# Patient Record
Sex: FEMALE | Race: WHITE | NOT HISPANIC OR LATINO
[De-identification: names, ages, dates, MRNs, and addresses within clinical notes are randomized per-mention and may not be internally consistent; named-entity substitution may affect disease eponyms.]

---

## 2022-10-30 ENCOUNTER — APPOINTMENT (OUTPATIENT)
Dept: ORTHOPEDIC SURGERY | Facility: CLINIC | Age: 47
End: 2022-10-30

## 2022-10-30 VITALS — BODY MASS INDEX: 24.44 KG/M2 | WEIGHT: 165 LBS | HEIGHT: 69 IN

## 2022-10-30 PROBLEM — Z00.00 ENCOUNTER FOR PREVENTIVE HEALTH EXAMINATION: Status: ACTIVE | Noted: 2022-10-30

## 2022-10-30 PROCEDURE — 20552 NJX 1/MLT TRIGGER POINT 1/2: CPT

## 2022-10-30 PROCEDURE — 99204 OFFICE O/P NEW MOD 45 MIN: CPT | Mod: 25

## 2022-10-30 PROCEDURE — J3490M: CUSTOM

## 2022-10-30 PROCEDURE — 99214 OFFICE O/P EST MOD 30 MIN: CPT | Mod: 25

## 2022-10-30 PROCEDURE — 72100 X-RAY EXAM L-S SPINE 2/3 VWS: CPT

## 2022-10-30 PROCEDURE — 72170 X-RAY EXAM OF PELVIS: CPT

## 2022-10-30 RX ORDER — CYCLOBENZAPRINE HYDROCHLORIDE 10 MG/1
10 TABLET, FILM COATED ORAL 3 TIMES DAILY
Qty: 30 | Refills: 1 | Status: ACTIVE | COMMUNITY
Start: 2022-10-30 | End: 1900-01-01

## 2022-10-30 NOTE — PROCEDURE
[Trigger point 1-2 muscle groups] : trigger point 1-2 muscle groups [Left] : of the left [Lumbar paraspinal muscle] : lumbar paraspinal muscle [Pain] : pain [Inflammation] : inflammation [Alcohol] : alcohol [Betadine] : betadine [Ethyl Chloride sprayed topically] : ethyl chloride sprayed topically [Sterile technique used] : sterile technique used [___ cc    1%] : Lidocaine ~Vcc of 1%  [___ cc    0.25%] : Bupivacaine (Marcaine) ~Vcc of 0.25%  [___ cc    40mg] : Methylprednisolone (Depomedrol) ~Vcc of 40 mg  [] : Patient tolerated procedure well [Call if redness, pain or fever occur] : call if redness, pain or fever occur [Apply ice for 15min out of every hour for the next 12-24 hours as tolerated] : apply ice for 15 minutes out of every hour for the next 12-24 hours as tolerated [Patient was advised to rest the joint(s) for ____ days] : patient was advised to rest the joint(s) for [unfilled] days [Risks, benefits, alternatives discussed / Verbal consent obtained] : the risks benefits, and alternatives have been discussed, and verbal consent was obtained

## 2022-10-30 NOTE — ASSESSMENT
[FreeTextEntry1] : A/P LBP without radiculopathy\par - trigger point injection\par - muscle relaxant\par - f/u spine 1-2 weeks\par

## 2022-10-30 NOTE — HISTORY OF PRESENT ILLNESS
[8] : 8 [de-identified] : 45 y/o M with L low back pain x 4 days after lifting a box. Denies numbness/tingling. denies bladder or bowel issues. Has tried tylenol and heat without relief.\par denies DM.\par \par PMHX: UC\par  [FreeTextEntry5] : pt states past wednesday lifted a heavy window and felt pain in her low back

## 2022-10-30 NOTE — IMAGING
[de-identified] : PE lumbar spine: +tenderness to L paraspinals, no midline tenderness, limited motion due to pain, able to SLR with pain, motor and sensory intact, NVI\par  [No bony abnormalities] : No bony abnormalities

## 2022-11-08 ENCOUNTER — APPOINTMENT (OUTPATIENT)
Dept: ORTHOPEDIC SURGERY | Facility: CLINIC | Age: 47
End: 2022-11-08

## 2022-11-08 DIAGNOSIS — M62.830 MUSCLE SPASM OF BACK: ICD-10-CM

## 2022-11-08 PROCEDURE — 99214 OFFICE O/P EST MOD 30 MIN: CPT

## 2022-11-08 PROCEDURE — 99204 OFFICE O/P NEW MOD 45 MIN: CPT

## 2022-11-08 RX ORDER — DICLOFENAC SODIUM 1% 10 MG/G
1 GEL TOPICAL DAILY
Qty: 1 | Refills: 3 | Status: ACTIVE | COMMUNITY
Start: 2022-11-08 | End: 1900-01-01

## 2022-11-08 RX ORDER — LIDOCAINE 40 MG/G
4 PATCH TOPICAL
Qty: 30 | Refills: 2 | Status: ACTIVE | COMMUNITY
Start: 2022-11-08 | End: 1900-01-01

## 2022-11-08 NOTE — HISTORY OF PRESENT ILLNESS
[6] : 6 [3] : 3 [Localized] : localized [Tightness] : tightness [de-identified] : Pt seen by non-spine partners in the past\par \par BERNARDO Berman note 10/30/22- "45 y/o M with L low back pain x 4 days after lifting a box. Denies numbness/tingling. denies bladder or bowel issues. Has tried tylenol and heat without relief."\par denies DM.\par \par PMHX: UC\par \par 11/8/22- Was given TPI in office and RXed cyclobenzaprine at [facility], with partial relief. Still no pain/N/T in BLEs.  [] : no [FreeTextEntry5] : pt states 10/26/2022 lifted a heavy window and felt pain in her low back  [de-identified] : BERNARDO Vaughn  [de-identified] : x-ray

## 2022-11-08 NOTE — ASSESSMENT
[FreeTextEntry1] : PT, c/w meds/topicals for lumbar spasm/ L SIJ inflammation\par cannot take NSAIDS due to UC\par follow up 6 weeks\par will consider imaging if no improvement\par \par Muscle Relaxants- To help decrease muscle spasm and assist with pain relief. Advised of sedating effects and instructed not to drive, operate heavy machinery, or take with other sedating medications.\par

## 2022-11-08 NOTE — IMAGING
[Straightening consistent with spasm] : Straightening consistent with spasm [AP] : anteroposterior [There are no fractures, subluxations or dislocations. No significant abnormalities are seen] : There are no fractures, subluxations or dislocations. No significant abnormalities are seen [de-identified] : LSPINE\par \par Palpation: No tenderness to palpation or spasm in bilateral thoracic and lumbar paraspinal musculature, L SI joint tenderness to palpation\par ROM: Full with stiffness\par Strength: 5/5 bilateral hip flexors, knee extensors, ankle dorsiflexors, EHL, ankle plantarflexors\par Sensation: Sensation present to light touch bilateral L2-S1 distributions\par Provocative maneuvers: Negative bilateral straight leg raise\par \par Bilateral hips-\par Palpation: No tenderness to palpation over greater trochanter or IT band\par \par \par

## 2022-11-10 ENCOUNTER — FORM ENCOUNTER (OUTPATIENT)
Age: 47
End: 2022-11-10

## 2022-12-03 ENCOUNTER — APPOINTMENT (OUTPATIENT)
Dept: ORTHOPEDIC SURGERY | Facility: CLINIC | Age: 47
End: 2022-12-03

## 2022-12-03 VITALS — WEIGHT: 165 LBS | HEIGHT: 69 IN | BODY MASS INDEX: 24.44 KG/M2

## 2022-12-03 DIAGNOSIS — Z78.9 OTHER SPECIFIED HEALTH STATUS: ICD-10-CM

## 2022-12-03 PROCEDURE — 99213 OFFICE O/P EST LOW 20 MIN: CPT

## 2022-12-03 RX ORDER — METHOCARBAMOL 750 MG/1
750 TABLET, FILM COATED ORAL TWICE DAILY
Qty: 60 | Refills: 0 | Status: ACTIVE | COMMUNITY
Start: 2022-12-03 | End: 1900-01-01

## 2022-12-03 NOTE — HISTORY OF PRESENT ILLNESS
[Lower back] : lower back [Dull/Aching] : dull/aching [Radiating] : radiating [Sharp] : sharp [Shooting] : shooting [Constant] : constant [Not working due to injury] : Work status: not working due to injury [6] : 6 [3] : 3 [Localized] : localized [Tightness] : tightness [de-identified] : 12-3-22- she has 6 week history of left sided lower back pain. Initially made some improvement with tpi from our urgent care. Then followed up with Dr Herrera and was sent for therapy with some help. She is now noting radicular complaints left leg to the foot, and ambulating with a limp \par \par \par Pt seen by non-spine partners in the past\par \par BERNARDO Berman note 10/30/22- "47 y/o M with L low back pain x 4 days after lifting a box. Denies numbness/tingling. denies bladder or bowel issues. Has tried tylenol and heat without relief."\par denies DM.\par \par PMHX: UC\par \par 11/8/22- Was given TPI in office and RXed cyclobenzaprine at [facility], with partial relief. Still no pain/N/T in BLEs.  [] : Post Surgical Visit: no [FreeTextEntry3] : 12/2/22 [FreeTextEntry5] : pt states 10/26/2022 lifted a heavy window and felt pain in her low back  [de-identified] : BERNARDO Vaughn  [de-identified] : x-ray

## 2022-12-03 NOTE — IMAGING
[Straightening consistent with spasm] : Straightening consistent with spasm [AP] : anteroposterior [There are no fractures, subluxations or dislocations. No significant abnormalities are seen] : There are no fractures, subluxations or dislocations. No significant abnormalities are seen [de-identified] : \par \par \par

## 2022-12-03 NOTE — PHYSICAL EXAM
[Flexion] : flexion [Extension] : extension [Bending to left] : bending to left [Bending to right] : bending to right [] : patient ambulates without assistive device [de-identified] : radicular complaints anterior left leg to the knee

## 2022-12-03 NOTE — ASSESSMENT
[FreeTextEntry1] : 12-3-22- she has had 6 weeks of conservative measures including therapy tpi and meds. she has now left leg radicular complaints. will add medrol and change muscle relaxer to robaxin. continue with therapy get mri f/u with DR Herrera likely need for lesi \par \par PT, c/w meds/topicals for lumbar spasm/ L SIJ inflammation\par cannot take NSAIDS due to UC\par follow up 6 weeks\par will consider imaging if no improvement\par \par Muscle Relaxants- To help decrease muscle spasm and assist with pain relief. Advised of sedating effects and instructed not to drive, operate heavy machinery, or take with other sedating medications.\par

## 2022-12-06 ENCOUNTER — FORM ENCOUNTER (OUTPATIENT)
Age: 47
End: 2022-12-06

## 2022-12-07 ENCOUNTER — APPOINTMENT (OUTPATIENT)
Dept: MRI IMAGING | Facility: CLINIC | Age: 47
End: 2022-12-07

## 2022-12-07 ENCOUNTER — TRANSCRIPTION ENCOUNTER (OUTPATIENT)
Age: 47
End: 2022-12-07

## 2022-12-07 PROCEDURE — 72148 MRI LUMBAR SPINE W/O DYE: CPT

## 2022-12-08 ENCOUNTER — NON-APPOINTMENT (OUTPATIENT)
Age: 47
End: 2022-12-08

## 2022-12-08 ENCOUNTER — APPOINTMENT (OUTPATIENT)
Dept: GASTROENTEROLOGY | Facility: CLINIC | Age: 47
End: 2022-12-08

## 2022-12-08 VITALS
OXYGEN SATURATION: 98 % | WEIGHT: 165 LBS | TEMPERATURE: 97.3 F | DIASTOLIC BLOOD PRESSURE: 90 MMHG | HEIGHT: 69 IN | HEART RATE: 70 BPM | BODY MASS INDEX: 24.44 KG/M2 | SYSTOLIC BLOOD PRESSURE: 140 MMHG

## 2022-12-08 PROCEDURE — 99203 OFFICE O/P NEW LOW 30 MIN: CPT

## 2022-12-08 NOTE — HISTORY OF PRESENT ILLNESS
[FreeTextEntry1] : 46F with history of ulcerative colitis, referred by Dr Pavon for second opinion. \par \par The patient initially presented to [facility] in August 2021 w/ bloody diarrhea. \par Colonoscopy 9/2021  showed normal TI, diffuse discontinuous abnormal vascularity, scarring, pseudopolyps. Minimal friability. Several shallow ulcers in right colon, rectum normal. \par Biopsies showed TI w lymphoid aggregates, colon biopsies showed mild chronic active to quiescent colitis in all biopsies\par \par She was started on po prednisone and oral mesalamine but continued to have loose stools (2-3x per day). Started on Zeposia in September 2022 via patient assistance program (not covered by her insurance). \par \par She has not noticed a change to her symptoms since starting zeposia. \par Had tapered off steroids a few weeks ago but then restarted by ortho team for back pain. Last dose of medrol is tomorrow. \par \par Currently has ~2 bowel movements per day, usually loose. \par No blood in stool. \par Denies abd pain, n/v, hematochezia, melena, rashes, eye pain, joint pain. \par \par No fhx of IBD or malignancy. \par \par \par \par

## 2022-12-08 NOTE — ASSESSMENT
[FreeTextEntry1] : 46F with ulcerative colitis, patient of Dr Pavon, referred for second opinion. Diagnosed in 2021, treated w prolonged prednisone taper, oral mesalamine, and now zeposia (since 9/2022). Reporting loose nonbloody bowel movements 2-3 x per day. No other complaints. No fhx of IBD or GI malignancy. \par \par - check inflammatory markers - crp, fecal debbie today \par - repeat labs and pre-infusion w/u \par - depending on above will consider repeat colonoscopy\par - if evidence of active and ongoing inflammation, will consider switching from zeposia to another agent - -perhaps entyvio vs stelara \par - continue mesalamine \par - continue zeposia \par - RTC after labs, colonoscopy \par \par

## 2022-12-12 ENCOUNTER — APPOINTMENT (OUTPATIENT)
Dept: ORTHOPEDIC SURGERY | Facility: CLINIC | Age: 47
End: 2022-12-12

## 2022-12-12 VITALS — WEIGHT: 165 LBS | HEIGHT: 69 IN | BODY MASS INDEX: 24.44 KG/M2

## 2022-12-12 DIAGNOSIS — M51.36 OTHER INTERVERTEBRAL DISC DEGENERATION, LUMBAR REGION: ICD-10-CM

## 2022-12-12 PROCEDURE — 99215 OFFICE O/P EST HI 40 MIN: CPT

## 2022-12-12 NOTE — HISTORY OF PRESENT ILLNESS
[Lower back] : lower back [Gradual] : gradual [de-identified] : 12/7/22 Lumbar MRI  - report noted in chart. \par Findings: Straightening of upper lordosis with maintenance of lower lordosis. No compression fracture. No\par spondylolysis. Hemangioma at the posterior T12.\par T12-L1: No herniation, foraminal stenosis, or central stenosis.\par L1-L2: No herniation, foraminal stenosis, or central stenosis. Facet arthrosis and ligamentum flavum \par hypertrophy.\par L2-L3: No herniation, foraminal stenosis, or central stenosis. Facet arthrosis, ligamentum flavum hypertrophy,\par and facet effusion.\par L3-L4: Broad bulge, facet arthrosis, ligamentum flavum hypertrophy with facet effusion. Left foraminal \par herniation and annular fissure with left foraminal stenosis. No central herniation or central stenosis.\par L4-L5: Minor retrolisthesis. Broad bulge, facet arthrosis, ligamentum flavum hypertrophy, and facet effusion. \par Left foraminal herniation with proximal left foraminal stenosis. No central herniation or central stenosis.\par L5-S1: No herniation, foraminal stenosis, or central stenosis. Facet arthrosis, ligamentum flavum hypertrophy,\par and left facet effusion.\par Conus terminates normally at T12-L1. No aneurysm of the aorta. Soft tissues are intact. No muscle tear. No \par ligament tear. No fluid collection.\par Dependent edema in the posterior subcutaneous fat.\par Incompletely evaluated greater than 12 mm Tarlov cyst posterior to the third sacral segment.\par Ind. review- \par Annular tear L L3/4 foramen\par NF HNP L L4/5\par ------------------------\par \par BERNARDO Berman note 10/30/22- "47 y/o F with L low back pain x 4 days after lifting a box. Denies numbness/tingling. denies bladder or bowel issues. Has tried tylenol and heat without relief."\par denies DM.\par \par PMHX: UC\par \par 11/8/22- Was given TPI in office and RXed cyclobenzaprine at [facility], with partial relief. Still no pain/N/T in BLEs. \par 12/12/22- MRI f/u. Has burning pain in to the LLE anterior thigh to knee [6] : 6 [3] : 3 [Localized] : localized [Tightness] : tightness [] : yes [FreeTextEntry5] : pt states 10/26/2022 lifted a heavy window and felt pain in her low back  [de-identified] : BERNARDO Vaughn  [de-identified] : x-ray

## 2022-12-12 NOTE — IMAGING
[de-identified] : LSPINE\par \par Palpation: No tenderness to palpation or spasm in bilateral thoracic and lumbar paraspinal musculature, L SI joint tenderness to palpation\par ROM: Full with stiffness\par Strength: 5/5 bilateral hip flexors, knee extensors, ankle dorsiflexors, EHL, ankle plantarflexors\par Sensation: Sensation present to light touch bilateral L2-S1 distributions\par Provocative maneuvers: Negative bilateral straight leg raise\par \par Bilateral hips-\par Palpation: No tenderness to palpation over greater trochanter or IT band\par \par \par  [Straightening consistent with spasm] : Straightening consistent with spasm [AP] : anteroposterior [There are no fractures, subluxations or dislocations. No significant abnormalities are seen] : There are no fractures, subluxations or dislocations. No significant abnormalities are seen

## 2022-12-12 NOTE — ASSESSMENT
[FreeTextEntry1] : Annular tear L L3/4 foramen\par NF HNP L L4/5\par Pain c/s\par \par Gabapentin- Patient advised of sedating effects, instructed not to drive, operate machinery, or take with other sedating medications. Advised of need to taper on/off medication and risk of abruptly stopping gabapentin. \par

## 2022-12-19 ENCOUNTER — NON-APPOINTMENT (OUTPATIENT)
Age: 47
End: 2022-12-19

## 2022-12-19 LAB
24R-OH-CALCIDIOL SERPL-MCNC: 42.7 PG/ML
ALBUMIN SERPL ELPH-MCNC: 5.1 G/DL
ALP BLD-CCNC: 117 U/L
ALT SERPL-CCNC: 18 U/L
ANION GAP SERPL CALC-SCNC: 13 MMOL/L
AST SERPL-CCNC: 18 U/L
BASOPHILS # BLD AUTO: 0.07 K/UL
BASOPHILS NFR BLD AUTO: 0.7 %
BILIRUB SERPL-MCNC: 0.3 MG/DL
BUN SERPL-MCNC: 14 MG/DL
C DIFF TOXIN B QL PCR REFLEX: NORMAL
CALCIUM SERPL-MCNC: 10.2 MG/DL
CHLORIDE SERPL-SCNC: 104 MMOL/L
CO2 SERPL-SCNC: 24 MMOL/L
CREAT SERPL-MCNC: 0.6 MG/DL
CRP SERPL-MCNC: <3 MG/L
EGFR: 111 ML/MIN/1.73M2
EOSINOPHIL # BLD AUTO: 0.03 K/UL
EOSINOPHIL NFR BLD AUTO: 0.3 %
GDH ANTIGEN: NOT DETECTED
GLUCOSE SERPL-MCNC: 104 MG/DL
HBV CORE IGG+IGM SER QL: NONREACTIVE
HBV SURFACE AB SER QL: NONREACTIVE
HBV SURFACE AG SER QL: NONREACTIVE
HCT VFR BLD CALC: 48.6 %
HCV AB SER QL: NONREACTIVE
HCV S/CO RATIO: 0.2 S/CO
HEPATITIS A IGG ANTIBODY: NONREACTIVE
HGB BLD-MCNC: 15.8 G/DL
IMM GRANULOCYTES NFR BLD AUTO: 0.7 %
LYMPHOCYTES # BLD AUTO: 0.52 K/UL
LYMPHOCYTES NFR BLD AUTO: 5.4 %
M TB IFN-G BLD-IMP: NEGATIVE
MAN DIFF?: NORMAL
MCHC RBC-ENTMCNC: 32.5 GM/DL
MCHC RBC-ENTMCNC: 32.5 PG
MCV RBC AUTO: 100 FL
MONOCYTES # BLD AUTO: 0.68 K/UL
MONOCYTES NFR BLD AUTO: 7 %
NEUTROPHILS # BLD AUTO: 8.3 K/UL
NEUTROPHILS NFR BLD AUTO: 85.9 %
PLATELET # BLD AUTO: 266 K/UL
POTASSIUM SERPL-SCNC: 4.6 MMOL/L
PROT SERPL-MCNC: 7.8 G/DL
QUANTIFERON TB PLUS MITOGEN MINUS NIL: 4.82 IU/ML
QUANTIFERON TB PLUS NIL: 0.02 IU/ML
QUANTIFERON TB PLUS TB1 MINUS NIL: 0 IU/ML
QUANTIFERON TB PLUS TB2 MINUS NIL: 0 IU/ML
RBC # BLD: 4.86 M/UL
RBC # FLD: 11.9 %
SODIUM SERPL-SCNC: 141 MMOL/L
TOXIN A AND B: NOT DETECTED
WBC # FLD AUTO: 9.67 K/UL

## 2022-12-20 LAB — CALPROTECTIN FECAL: 41 UG/G

## 2022-12-30 ENCOUNTER — APPOINTMENT (OUTPATIENT)
Dept: PAIN MANAGEMENT | Facility: CLINIC | Age: 47
End: 2022-12-30
Payer: COMMERCIAL

## 2022-12-30 VITALS — WEIGHT: 171 LBS | BODY MASS INDEX: 25.33 KG/M2 | HEIGHT: 69 IN

## 2022-12-30 DIAGNOSIS — M79.10 MYALGIA, UNSPECIFIED SITE: ICD-10-CM

## 2022-12-30 DIAGNOSIS — M53.3 SACROCOCCYGEAL DISORDERS, NOT ELSEWHERE CLASSIFIED: ICD-10-CM

## 2022-12-30 PROCEDURE — 99204 OFFICE O/P NEW MOD 45 MIN: CPT

## 2022-12-30 NOTE — HISTORY OF PRESENT ILLNESS
[Lower back] : lower back [Left Leg] : left leg [Burning] : burning [Sharp] : sharp [Constant] : constant [Household chores] : household chores [Leisure] : leisure [Sleep] : sleep [Social interactions] : social interactions [Meds] : meds [Heat] : heat [Sitting] : sitting [Standing] : standing [Walking] : walking [Bending forward] : bending forward [Exercising] : exercising [Stairs] : stairs [Lying in bed] : lying in bed [FreeTextEntry1] : Initial HPI 12/30/2022:\par Pain started Oct 2022 and is on the LEFT side of the lower back and radiates down the left medial thigh to he knee described as a sharp burning pain with associated heaviness in the leg. No numbness/tingling. Pain is worse with sitting.  \par \par MRI Lumbar Spine 12/7/22 independently reviewed: L3-4, L4-5 Left HNP \par Conservative Care: Has tried PT \par Pain Medications: gabapentin 300mg BID \par Past Injections: none\par Spine surgery: none \par Blood thinners: none\par  [] : no

## 2022-12-30 NOTE — PHYSICAL EXAM
[de-identified] : Constitutional; Appears well, no apparent distress\par Ability to communicate: Normal \par Respiratory: non-labored breathing\par Skin: No rash noted\par Head: Normocephalic, atraumatic\par Neck: no visible thyroid enlargement\par Eyes: Extraocular movements intact\par Neurologic: Alert and oriented x3\par Psychiatric: normal mood, affect and behavior \par \par  [Flexion] : flexion [] : antalgic

## 2022-12-30 NOTE — DISCUSSION/SUMMARY
[de-identified] : After discussing various treatment options with the patient including but not limited to oral medications, physical therapy, exercise modalities as well as interventional spinal injections, we have decided with the following plan:\par \par - Continue Home exercises, stretching, activity modification, physical therapy, and conservative care.\par - MRI report and/or images was reviewed and discussed with the patient.\par - Recommend Left L3-4, L4-5 Transforaminal Epidural Steroid Injection under fluoroscopic guidance with image.\par - The risks, benefits and alternatives of the proposed procedure were explained in detail with the patient. The risks outlined include but are not limited to infection, bleeding, post-dural puncture headache, nerve injury, a temporary increase in pain, failure to resolve symptoms, allergic reaction, symptom recurrence, and possible elevation of blood sugar in diabetics. All questions were answered to patient's apparent satisfaction and he/she verbalized an understanding.\par - Patient is presenting with acute/sub-acute radicular pain with impairment in ADLs and functionality.  The pain has not responded to conservative care including NSAID therapy and/or physical therapy.  There is no bleeding tendency, unstable medical condition, or systemic infection.\par - Follow up in 1-2 weeks post injection for re-evaluation.\par

## 2023-01-11 ENCOUNTER — RX RENEWAL (OUTPATIENT)
Age: 48
End: 2023-01-11

## 2023-01-11 RX ORDER — GABAPENTIN 300 MG/1
300 CAPSULE ORAL
Qty: 90 | Refills: 0 | Status: ACTIVE | COMMUNITY
Start: 2022-12-12 | End: 1900-01-01

## 2023-01-16 LAB — SARS-COV-2 N GENE NPH QL NAA+PROBE: NOT DETECTED

## 2023-01-18 ENCOUNTER — APPOINTMENT (OUTPATIENT)
Age: 48
End: 2023-01-18
Payer: COMMERCIAL

## 2023-01-18 PROCEDURE — 64484 NJX AA&/STRD TFRM EPI L/S EA: CPT | Mod: 59,LT

## 2023-01-18 PROCEDURE — 64483 NJX AA&/STRD TFRM EPI L/S 1: CPT | Mod: LT

## 2023-01-31 LAB — SARS-COV-2 N GENE NPH QL NAA+PROBE: NOT DETECTED

## 2023-02-02 ENCOUNTER — APPOINTMENT (OUTPATIENT)
Dept: GASTROENTEROLOGY | Facility: AMBULATORY MEDICAL SERVICES | Age: 48
End: 2023-02-02
Payer: COMMERCIAL

## 2023-02-02 PROCEDURE — 45380 COLONOSCOPY AND BIOPSY: CPT

## 2023-02-03 ENCOUNTER — APPOINTMENT (OUTPATIENT)
Dept: PAIN MANAGEMENT | Facility: CLINIC | Age: 48
End: 2023-02-03
Payer: COMMERCIAL

## 2023-02-03 VITALS — HEIGHT: 69 IN | WEIGHT: 171 LBS | BODY MASS INDEX: 25.33 KG/M2

## 2023-02-03 PROCEDURE — 99214 OFFICE O/P EST MOD 30 MIN: CPT

## 2023-02-03 NOTE — HISTORY OF PRESENT ILLNESS
[Lower back] : lower back [Left Leg] : left leg [Sharp] : sharp [Constant] : constant [Household chores] : household chores [Leisure] : leisure [Sleep] : sleep [Social interactions] : social interactions [Meds] : meds [Heat] : heat [Sitting] : sitting [Standing] : standing [Walking] : walking [Bending forward] : bending forward [Exercising] : exercising [Stairs] : stairs [Lying in bed] : lying in bed [3] : 3 [Injection therapy] : injection therapy [FreeTextEntry1] : 02/03/2023: s/p left L3-4, L4-5 TFESI on 1/18/23 with >70% relief and improvement of ADLs. Had great relief after the injection for over a week and then started to return but still much better than prio to the injection. \par \par Initial HPI 12/30/2022:\par Pain started Oct 2022 and is on the LEFT side of the lower back and radiates down the left medial thigh to the knee described as a sharp burning pain with associated heaviness in the leg. No numbness/tingling. Pain is worse with sitting.  \par \par MRI Lumbar Spine 12/7/22 independently reviewed: L3-4, L4-5 Left HNP \par Conservative Care: Has tried PT \par Pain Medications: gabapentin 300mg BID \par Past Injections: none\par Spine surgery: none \par Blood thinners: none\par  [] : no [FreeTextEntry7] : left leg

## 2023-02-03 NOTE — PHYSICAL EXAM
[Flexion] : flexion [de-identified] : Constitutional; Appears well, no apparent distress\par Ability to communicate: Normal \par Respiratory: non-labored breathing\par Skin: No rash noted\par Head: Normocephalic, atraumatic\par Neck: no visible thyroid enlargement\par Eyes: Extraocular movements intact\par Neurologic: Alert and oriented x3\par Psychiatric: normal mood, affect and behavior \par \par  [] : negative sitting straight leg raise

## 2023-02-03 NOTE — DISCUSSION/SUMMARY
[de-identified] : After discussing various treatment options with the patient including but not limited to oral medications, physical therapy, exercise modalities as well as interventional spinal injections, we have decided with the following plan:\par \par - Continue Home exercises, stretching, activity modification, physical therapy, and conservative care.\par - MRI report and/or images was reviewed and discussed with the patient.\par - Recommend Left L3-4, L4-5 Transforaminal Epidural Steroid Injection under fluoroscopic guidance with image.\par - The risks, benefits and alternatives of the proposed procedure were explained in detail with the patient. The risks outlined include but are not limited to infection, bleeding, post-dural puncture headache, nerve injury, a temporary increase in pain, failure to resolve symptoms, allergic reaction, symptom recurrence, and possible elevation of blood sugar in diabetics. All questions were answered to patient's apparent satisfaction and he/she verbalized an understanding.\par - Patient is presenting with acute/sub-acute radicular pain with impairment in ADLs and functionality.  The pain has not responded to conservative care including NSAID therapy and/or physical therapy.  There is no bleeding tendency, unstable medical condition, or systemic infection.\par - Follow up in 1-2 weeks post injection for re-evaluation.\par

## 2023-02-18 ENCOUNTER — LABORATORY RESULT (OUTPATIENT)
Age: 48
End: 2023-02-18

## 2023-02-22 ENCOUNTER — APPOINTMENT (OUTPATIENT)
Age: 48
End: 2023-02-22
Payer: COMMERCIAL

## 2023-02-22 PROCEDURE — 64483 NJX AA&/STRD TFRM EPI L/S 1: CPT | Mod: LT

## 2023-02-22 PROCEDURE — 64484 NJX AA&/STRD TFRM EPI L/S EA: CPT | Mod: 59,LT

## 2023-03-01 ENCOUNTER — APPOINTMENT (OUTPATIENT)
Dept: ORTHOPEDIC SURGERY | Facility: CLINIC | Age: 48
End: 2023-03-01
Payer: COMMERCIAL

## 2023-03-01 VITALS — BODY MASS INDEX: 24.44 KG/M2 | HEIGHT: 69 IN | WEIGHT: 165 LBS

## 2023-03-01 DIAGNOSIS — M17.11 UNILATERAL PRIMARY OSTEOARTHRITIS, RIGHT KNEE: ICD-10-CM

## 2023-03-01 DIAGNOSIS — Z87.891 PERSONAL HISTORY OF NICOTINE DEPENDENCE: ICD-10-CM

## 2023-03-01 DIAGNOSIS — Z87.19 PERSONAL HISTORY OF OTHER DISEASES OF THE DIGESTIVE SYSTEM: ICD-10-CM

## 2023-03-01 PROCEDURE — 20610 DRAIN/INJ JOINT/BURSA W/O US: CPT | Mod: RT

## 2023-03-01 PROCEDURE — 99214 OFFICE O/P EST MOD 30 MIN: CPT | Mod: 25

## 2023-03-01 PROCEDURE — 73564 X-RAY EXAM KNEE 4 OR MORE: CPT | Mod: RT

## 2023-03-01 NOTE — PROCEDURE
[FreeTextEntry3] : \par A Large joint injection was performed of the [RIGHT KNEE]. The indication for this procedure was pain and inflammation, and patient had decreased mobility in the joint. Risks, benefits and alternatives to a steroid injection procedure were discussed; Risks outlined include but are not limited to infection, sepsis, bleeding, scarring, skin discoloration, temporary increase in pain, syncopal episode, failure to resolve symptoms, allergic reaction, symptom recurrence, and elevation of blood sugar in diabetics.. All questions were answered to the patient's apparent satisfaction and informed consent obtained. Prior to injection a 'Time Out' was conducted in accordance with [facility] policy and the site and nature of procedure verified with the patient. \par  \par Procedure: The area of injection was prepared in a sterile fashion. The injection and aspiration was carried out utilizing sterile technique. The site was prepped with alcohol, betadine, ethyl chloride sprayed topically and sterile technique used.\par  \par ( X ) 1cc of Celestone(30mg/ml) \par (  )   1cc of 0.5% Bupivacaine \par (  )   2cc of 1% Lidocaine \par ( X ) 2cc of 1% Lidocaine \par \par Patient tolerated the procedure well and direct pressure was applied for hemostasis. The patient was reminded of potential post-injection risks including, but not limited to, delayed hypersensitivity reactions and/or infection. Ice tonight to the injection site.\par \par

## 2023-03-01 NOTE — HISTORY OF PRESENT ILLNESS
[Gradual] : gradual [Result of repetitive motion] : result of repetitive motion [8] : 8 [1] : 2 [Dull/Aching] : dull/aching [Occasional] : occasional [de-identified] : 3/1/23: Patient presents with persistent pain in the right knee. No recent injury. Describes anterior and medial knee pain that has been worse with activity. Cannot take NSAIDs due to UC. [] : no [de-identified] : 2021 [de-identified] : none

## 2023-03-01 NOTE — ASSESSMENT
[FreeTextEntry1] : 3/1/23: X-rays today - mild degen change with calcification of medial meniscus, irregularity in the later femoral groove\par Pathology and treatment options reviewed with patient and her .\par MRI to eval for MMT and nikos abnormality\par Recommend BMD to eval for osteoporosis. Will follow up with PCP.\par Right knee CSI tolerated well.\par Discussed possible visco in the future.\par Return with MRI results.

## 2023-03-01 NOTE — PHYSICAL EXAM
[Right] : right knee [NL (0)] : extension 0 degrees [5___] : hamstring 5[unfilled]/5 [] : no erythema [TWNoteComboBox7] : flexion 130 degrees

## 2023-03-01 NOTE — DISCUSSION/SUMMARY
[de-identified] : Progress note completed by Iesha Levy PA-C under the direct supervision of Triston Paulino MD.\par

## 2023-03-06 ENCOUNTER — FORM ENCOUNTER (OUTPATIENT)
Age: 48
End: 2023-03-06

## 2023-03-07 ENCOUNTER — APPOINTMENT (OUTPATIENT)
Dept: MRI IMAGING | Facility: CLINIC | Age: 48
End: 2023-03-07
Payer: COMMERCIAL

## 2023-03-07 PROCEDURE — 73721 MRI JNT OF LWR EXTRE W/O DYE: CPT | Mod: RT

## 2023-03-10 ENCOUNTER — APPOINTMENT (OUTPATIENT)
Dept: PAIN MANAGEMENT | Facility: CLINIC | Age: 48
End: 2023-03-10
Payer: COMMERCIAL

## 2023-03-10 VITALS — BODY MASS INDEX: 24.44 KG/M2 | HEIGHT: 69 IN | WEIGHT: 165 LBS

## 2023-03-10 DIAGNOSIS — M54.50 LOW BACK PAIN, UNSPECIFIED: ICD-10-CM

## 2023-03-10 DIAGNOSIS — M54.17 RADICULOPATHY, LUMBOSACRAL REGION: ICD-10-CM

## 2023-03-10 DIAGNOSIS — M54.16 RADICULOPATHY, LUMBAR REGION: ICD-10-CM

## 2023-03-10 PROCEDURE — 99213 OFFICE O/P EST LOW 20 MIN: CPT

## 2023-03-10 NOTE — DISCUSSION/SUMMARY
[de-identified] : After discussing various treatment options with the patient including but not limited to oral medications, physical therapy, exercise modalities as well as interventional spinal injections, we have decided with the following plan:\par \par - Continue home exercises, stretching, activity modification, physical therapy, and conservative care.\par - Follow-up as needed.\par - Can d/c gabapentin

## 2023-03-10 NOTE — HISTORY OF PRESENT ILLNESS
[Lower back] : lower back [Left Leg] : left leg [Sharp] : sharp [Household chores] : household chores [Leisure] : leisure [Sleep] : sleep [Social interactions] : social interactions [Meds] : meds [Heat] : heat [Injection therapy] : injection therapy [Sitting] : sitting [Standing] : standing [Walking] : walking [Bending forward] : bending forward [Exercising] : exercising [Stairs] : stairs [Lying in bed] : lying in bed [0] : 0 [Occasional] : occasional [FreeTextEntry1] : 03/10/2023 : s/p Left L3-4 , L4-5 TFESI  on 02/22/23 with 100% relief and improvement of ADLs. Has been feeling great since injection. \par \par 02/03/2023: s/p left L3-4, L4-5 TFESI on 1/18/23 with >70% relief and improvement of ADLs. Had great relief after the injection for over a week and then started to return but still much better than prior to the injection. \par \par Initial HPI 12/30/2022:\par Pain started Oct 2022 and is on the LEFT side of the lower back and radiates down the left medial thigh to the knee described as a sharp burning pain with associated heaviness in the leg. No numbness/tingling. Pain is worse with sitting.  \par \par MRI Lumbar Spine 12/7/22 independently reviewed: L3-4, L4-5 Left HNP \par Conservative Care: Has tried PT \par Pain Medications: gabapentin 300mg BID \par Past Injections: none\par Spine surgery: none \par Blood thinners: none\par  [] : no [FreeTextEntry6] : stiffness [FreeTextEntry7] : left leg [TWNoteComboBox1] : 100%

## 2023-03-10 NOTE — PHYSICAL EXAM
[Flexion] : flexion [de-identified] : Constitutional; Appears well, no apparent distress\par Ability to communicate: Normal \par Respiratory: non-labored breathing\par Skin: No rash noted\par Head: Normocephalic, atraumatic\par Neck: no visible thyroid enlargement\par Eyes: Extraocular movements intact\par Neurologic: Alert and oriented x3\par Psychiatric: normal mood, affect and behavior \par \par  [] : negative sitting straight leg raise

## 2023-03-15 ENCOUNTER — APPOINTMENT (OUTPATIENT)
Dept: ORTHOPEDIC SURGERY | Facility: CLINIC | Age: 48
End: 2023-03-15

## 2023-03-28 ENCOUNTER — APPOINTMENT (OUTPATIENT)
Dept: GASTROENTEROLOGY | Facility: CLINIC | Age: 48
End: 2023-03-28
Payer: COMMERCIAL

## 2023-03-28 VITALS
HEIGHT: 69 IN | TEMPERATURE: 97.3 F | WEIGHT: 170 LBS | DIASTOLIC BLOOD PRESSURE: 80 MMHG | OXYGEN SATURATION: 98 % | BODY MASS INDEX: 25.18 KG/M2 | SYSTOLIC BLOOD PRESSURE: 124 MMHG | HEART RATE: 88 BPM

## 2023-03-28 PROCEDURE — 99213 OFFICE O/P EST LOW 20 MIN: CPT

## 2023-03-30 ENCOUNTER — APPOINTMENT (OUTPATIENT)
Dept: ORTHOPEDIC SURGERY | Facility: CLINIC | Age: 48
End: 2023-03-30
Payer: COMMERCIAL

## 2023-03-30 VITALS — WEIGHT: 170 LBS | HEIGHT: 69 IN | BODY MASS INDEX: 25.18 KG/M2

## 2023-03-30 DIAGNOSIS — M87.051 IDIOPATHIC ASEPTIC NECROSIS OF RIGHT FEMUR: ICD-10-CM

## 2023-03-30 PROCEDURE — 99214 OFFICE O/P EST MOD 30 MIN: CPT

## 2023-03-30 RX ORDER — HYALURONATE SODIUM 30 MG/2 ML
30 SYRINGE (ML) INTRAARTICULAR
Qty: 4 | Refills: 0 | Status: ACTIVE | COMMUNITY
Start: 2023-03-30

## 2023-03-30 NOTE — HISTORY OF PRESENT ILLNESS
[Gradual] : gradual [8] : 8 [2] : 2 [Dull/Aching] : dull/aching [Sharp] : sharp [Stabbing] : stabbing [Frequent] : frequent [Leisure] : leisure [Rest] : rest [Walking] : walking [de-identified] : Ms. MARIANN HARDWICK is a 47 year female that comes in today with a chief complaint of with right knee pain. has had CSI in the past with few days of relief. no gel injections. Has hx of high dose steroid admnistration because of IBD. Pain with ambulation. Prev seen dr. Paulino and was referred over due to MRI findings of AVN. [] : Post Surgical Visit: no [FreeTextEntry1] : right knee  [FreeTextEntry5] : pt had sen Dr. Paulino before and was told to get a MRI and to follow up with Dr. Rondon. [de-identified] : Dr. Paulino  [de-identified] : xrays/mri

## 2023-03-30 NOTE — ASSESSMENT
[FreeTextEntry1] : 47 year F with right knee AVN\par \par discussed options with patient in depth \par will try orthovisc at this time and schedule with surgery later on this summer. patient knows she will need to wait 3 months after injection for surgery

## 2023-03-30 NOTE — IMAGING
[de-identified] : Constitutional: well developed and well nourished, able to communicate\par Cardiovascular: Peripheral vascular exam is grossly normal\par Neurologic: Alert and oriented, no acute distress.\par Skin: normal skin with no ulcers, rashes, or lesions\par Pulmonary: No respiratory distress, breathing comfortably on room air\par Lymphatics: No obvious lymphadenopathy or lymphedema in areas examined\par \par Right Knee Exam\par Alignment: Neutral\par Effusion: None\par Atrophy: None                                                \par Stable to Varus/valgus stress\par Posterior Drawer Test: negative\par Anterior Drawer Test: Negative\par Knee Extension/Flexion: 0 / 120\par \par Medial/lateral compartments\par Medial joint line: POS Tenderness\par Lateral joint line: POS Tenderness\par Fitz test: negative\par \par Patellofemoral joint\par Medial patellar facet: no tenderness\par Patellar grind: Negative\par \par Tendons:\par Pes Anserine: No tenderness\par Gerdy’s Tubercle/ IT Band: No tenderness\par Quadriceps Tendon: No Tenderness\par patellar tendon: no Tenderness\par Tibial tubercle: not tenderness\par Calf: no Tenderness\par \par Neurovascular exam\par Muscle strength: 5/5\par Sensation to light touch: intact\par Distal pulses: 2+\par \par MRI: R knee\par 1. Multiple large areas of AVN (osteonecrosis) throughout the medial and lateral femoral condyles and lateral \par femoral trochlea and probable bone infarct in the lateral distal femoral metaphysis. Clinical correlation \par regarding etiology is recommended.\par 2. Large horizontal tearing throughout the posterior horn and body of the medial meniscus with parameniscal \par cysts and surrounding synovitis along with chondral loss and joint space narrowing medially.\par 3. Mild patellofemoral effusion and synovitis and medial plica.\par 4. No ligament tear or lateral meniscal tear.\par 5. Clinical correlation and follow up is recommended.

## 2023-03-30 NOTE — HISTORY OF PRESENT ILLNESS
[Gradual] : gradual [8] : 8 [2] : 2 [Dull/Aching] : dull/aching [Sharp] : sharp [Stabbing] : stabbing [Frequent] : frequent [Leisure] : leisure [Rest] : rest [Walking] : walking [de-identified] : Ms. MARIANN HARDWICK is a 47 year female that comes in today with a chief complaint of with right knee pain. has had CSI in the past with few days of relief. no gel injections. Has hx of high dose steroid admnistration because of IBD. Pain with ambulation. Prev seen dr. Paulino and was referred over due to MRI findings of AVN. [] : Post Surgical Visit: no [FreeTextEntry1] : right knee  [FreeTextEntry5] : pt had sen Dr. Paulino before and was told to get a MRI and to follow up with Dr. Rondon. [de-identified] : Dr. Paulino  [de-identified] : xrays/mri

## 2023-03-30 NOTE — IMAGING
[de-identified] : Constitutional: well developed and well nourished, able to communicate\par Cardiovascular: Peripheral vascular exam is grossly normal\par Neurologic: Alert and oriented, no acute distress.\par Skin: normal skin with no ulcers, rashes, or lesions\par Pulmonary: No respiratory distress, breathing comfortably on room air\par Lymphatics: No obvious lymphadenopathy or lymphedema in areas examined\par \par Right Knee Exam\par Alignment: Neutral\par Effusion: None\par Atrophy: None                                                \par Stable to Varus/valgus stress\par Posterior Drawer Test: negative\par Anterior Drawer Test: Negative\par Knee Extension/Flexion: 0 / 120\par \par Medial/lateral compartments\par Medial joint line: POS Tenderness\par Lateral joint line: POS Tenderness\par Fitz test: negative\par \par Patellofemoral joint\par Medial patellar facet: no tenderness\par Patellar grind: Negative\par \par Tendons:\par Pes Anserine: No tenderness\par Gerdy’s Tubercle/ IT Band: No tenderness\par Quadriceps Tendon: No Tenderness\par patellar tendon: no Tenderness\par Tibial tubercle: not tenderness\par Calf: no Tenderness\par \par Neurovascular exam\par Muscle strength: 5/5\par Sensation to light touch: intact\par Distal pulses: 2+\par \par MRI: R knee\par 1. Multiple large areas of AVN (osteonecrosis) throughout the medial and lateral femoral condyles and lateral \par femoral trochlea and probable bone infarct in the lateral distal femoral metaphysis. Clinical correlation \par regarding etiology is recommended.\par 2. Large horizontal tearing throughout the posterior horn and body of the medial meniscus with parameniscal \par cysts and surrounding synovitis along with chondral loss and joint space narrowing medially.\par 3. Mild patellofemoral effusion and synovitis and medial plica.\par 4. No ligament tear or lateral meniscal tear.\par 5. Clinical correlation and follow up is recommended.

## 2023-04-03 RX ORDER — METHYLPREDNISOLONE 4 MG/1
4 TABLET ORAL
Qty: 1 | Refills: 0 | Status: DISCONTINUED | COMMUNITY
Start: 2022-12-03 | End: 2023-04-03

## 2023-04-03 RX ORDER — PREDNISONE INTENSOL 5 MG/ML
SOLUTION, CONCENTRATE ORAL
Refills: 0 | Status: DISCONTINUED | COMMUNITY
End: 2023-04-03

## 2023-04-03 RX ORDER — SODIUM SULFATE, POTASSIUM SULFATE AND MAGNESIUM SULFATE 1.6; 3.13; 17.5 G/177ML; G/177ML; G/177ML
17.5-3.13-1.6 SOLUTION ORAL
Qty: 1 | Refills: 0 | Status: DISCONTINUED | COMMUNITY
Start: 2022-12-19 | End: 2023-04-03

## 2023-04-03 NOTE — HISTORY OF PRESENT ILLNESS
[FreeTextEntry1] : Here for follow up visit. \par On zeposia and mesalamine. \par Feels well overall. \par Reports one soft to formed nonbloody bowel movement per day. \par Denies abd pain, n/v, rashes, eye pain, joint pain. \par No new complaints today. \par \par \par ----------------------------------------------------------------------------\par IBD HISTORY - \par \par She initially presented to [facility] in August 2021 w/ bloody diarrhea. \par Colonoscopy 9/2021 showed normal TI, diffuse discontinuous abnormal vascularity, scarring, pseudopolyps. Minimal friability. Several shallow ulcers in right colon, rectum normal. \par Biopsies showed TI w lymphoid aggregates, colon biopsies showed mild chronic active to quiescent colitis in all biopsies\par \par She was started on po prednisone and oral mesalamine but continued to have loose stools (2-3x per day). Started on Zeposia in September 2022 via patient assistance program (not covered by her insurance). Currently doing well. \par \par

## 2023-04-03 NOTE — ASSESSMENT
[FreeTextEntry1] : \par 47F with ulcerative colitis, patient of Dr Pavon, referred for second opinion. Diagnosed in 2021, treated w prolonged prednisone taper, oral mesalamine, and now zeposia (since 9/2022). Here for follow up visit. No fhx of IBD or GI malignancy. \par \par # UC \par - s/p colonoscopy 2/2023 - normal TI, mild erythema / Jon 1 in whole colon, extensive pseudo polyposis. Path showing focal minimal crypt distortion, per path - quiescent colitis without evidence of active colitis or dysplasia. \par - inflamamtory markers - Dec 2022 - CRP <3, calprotectin 41\par - continue mesalamine \par - continue zeposia (obtains it through Dr Etienne office via patient assistance program). \par \par # Preventative Care \par - due for routine GYN eval, number for ob/gyn at [facility] given \par - follows w derm, advised to make appt for annual skin check \par - dexa scan done with PCP earlier this month (no record in our system), was referred to ortho for arthritis \par - getting hep b vaccine series w pcp\par - will discuss pneumonia vaccines with pcp \par - repeat colonoscopy in 1 yr due to extensive pseudo polyps \par - will continue to follow w both Dr Pavon and this office \par \par RTC 6 mos \par

## 2023-06-02 ENCOUNTER — NON-APPOINTMENT (OUTPATIENT)
Age: 48
End: 2023-06-02

## 2023-10-11 ENCOUNTER — APPOINTMENT (OUTPATIENT)
Dept: GASTROENTEROLOGY | Facility: CLINIC | Age: 48
End: 2023-10-11
Payer: COMMERCIAL

## 2023-10-11 VITALS
HEIGHT: 69 IN | DIASTOLIC BLOOD PRESSURE: 76 MMHG | SYSTOLIC BLOOD PRESSURE: 118 MMHG | OXYGEN SATURATION: 98 % | HEART RATE: 89 BPM | WEIGHT: 165 LBS | BODY MASS INDEX: 24.44 KG/M2

## 2023-10-11 PROCEDURE — 99214 OFFICE O/P EST MOD 30 MIN: CPT

## 2023-10-20 ENCOUNTER — NON-APPOINTMENT (OUTPATIENT)
Age: 48
End: 2023-10-20

## 2023-10-20 LAB
25(OH)D3 SERPL-MCNC: 31 NG/ML
ALBUMIN SERPL ELPH-MCNC: 5.1 G/DL
ALP BLD-CCNC: 154 U/L
ALT SERPL-CCNC: 31 U/L
ANION GAP SERPL CALC-SCNC: 13 MMOL/L
AST SERPL-CCNC: 35 U/L
BILIRUB SERPL-MCNC: 0.3 MG/DL
BUN SERPL-MCNC: 10 MG/DL
CALCIUM SERPL-MCNC: 10.1 MG/DL
CHLORIDE SERPL-SCNC: 103 MMOL/L
CO2 SERPL-SCNC: 25 MMOL/L
CREAT SERPL-MCNC: 0.56 MG/DL
CRP SERPL-MCNC: <3 MG/L
EGFR: 112 ML/MIN/1.73M2
FOLATE SERPL-MCNC: >20 NG/ML
GLUCOSE SERPL-MCNC: 102 MG/DL
HBV CORE IGG+IGM SER QL: NONREACTIVE
HBV SURFACE AB SER QL: NONREACTIVE
HBV SURFACE AG SER QL: NONREACTIVE
HCT VFR BLD CALC: 46.5 %
HCV AB SER QL: NONREACTIVE
HCV S/CO RATIO: 0.14 S/CO
HEPATITIS A IGG ANTIBODY: NONREACTIVE
HGB BLD-MCNC: 14.6 G/DL
M TB IFN-G BLD-IMP: NEGATIVE
MCHC RBC-ENTMCNC: 30.9 PG
MCHC RBC-ENTMCNC: 31.4 GM/DL
MCV RBC AUTO: 98.5 FL
PLATELET # BLD AUTO: 268 K/UL
POTASSIUM SERPL-SCNC: 4.4 MMOL/L
PROT SERPL-MCNC: 7.5 G/DL
QUANTIFERON TB PLUS MITOGEN MINUS NIL: >10 IU/ML
QUANTIFERON TB PLUS NIL: 0.05 IU/ML
QUANTIFERON TB PLUS TB1 MINUS NIL: 0.03 IU/ML
QUANTIFERON TB PLUS TB2 MINUS NIL: 0.02 IU/ML
RBC # BLD: 4.72 M/UL
RBC # FLD: 12.9 %
SODIUM SERPL-SCNC: 142 MMOL/L
VIT B12 SERPL-MCNC: 1135 PG/ML
WBC # FLD AUTO: 7.07 K/UL

## 2024-01-24 ENCOUNTER — APPOINTMENT (OUTPATIENT)
Dept: GASTROENTEROLOGY | Facility: CLINIC | Age: 49
End: 2024-01-24
Payer: COMMERCIAL

## 2024-01-24 VITALS
DIASTOLIC BLOOD PRESSURE: 75 MMHG | WEIGHT: 162 LBS | SYSTOLIC BLOOD PRESSURE: 121 MMHG | OXYGEN SATURATION: 97 % | HEART RATE: 96 BPM | HEIGHT: 69 IN | BODY MASS INDEX: 23.99 KG/M2

## 2024-01-24 PROCEDURE — 99213 OFFICE O/P EST LOW 20 MIN: CPT

## 2024-01-24 NOTE — HISTORY OF PRESENT ILLNESS
[FreeTextEntry1] : Here for f/u visit  Last seen Ocotober 2023  stopped zeposia beginnig of november du eot insurance issues  feels well  bowel movements once a day  formed stool  no blood or diarreha  no abd pain, n no rashes visoin changes  now weight loss   when stops mesalamine has symptoms  plan  - dermatolgoy  - crp, fecal calprotectin  - colonoscopy

## 2024-01-25 LAB — CRP SERPL-MCNC: <3 MG/L

## 2024-02-12 LAB — CALPROTECTIN FECAL: 12 UG/G

## 2024-02-14 ENCOUNTER — OUTPATIENT (OUTPATIENT)
Dept: OUTPATIENT SERVICES | Facility: HOSPITAL | Age: 49
LOS: 1 days | End: 2024-02-14
Payer: COMMERCIAL

## 2024-02-14 ENCOUNTER — APPOINTMENT (OUTPATIENT)
Dept: ULTRASOUND IMAGING | Facility: CLINIC | Age: 49
End: 2024-02-14
Payer: COMMERCIAL

## 2024-02-14 DIAGNOSIS — K51.90 ULCERATIVE COLITIS, UNSPECIFIED, WITHOUT COMPLICATIONS: ICD-10-CM

## 2024-02-14 PROCEDURE — 76700 US EXAM ABDOM COMPLETE: CPT | Mod: 26

## 2024-02-14 PROCEDURE — 76700 US EXAM ABDOM COMPLETE: CPT

## 2024-02-20 DIAGNOSIS — R74.8 ABNORMAL LEVELS OF OTHER SERUM ENZYMES: ICD-10-CM

## 2024-02-26 LAB
ALBUMIN SERPL ELPH-MCNC: 5 G/DL
ALP BLD-CCNC: 117 U/L
ALT SERPL-CCNC: 24 U/L
AST SERPL-CCNC: 26 U/L
BILIRUB DIRECT SERPL-MCNC: 0.2 MG/DL
BILIRUB INDIRECT SERPL-MCNC: 0.3 MG/DL
BILIRUB SERPL-MCNC: 0.4 MG/DL
PROT SERPL-MCNC: 7.5 G/DL

## 2024-02-29 LAB
ALP BLD-CCNC: 112 IU/L
ALP BONE CFR SERPL: 36 %
ALP INTEST CFR SERPL: 9 %
ALP LIVER CFR SERPL: 55 %

## 2024-03-14 ENCOUNTER — APPOINTMENT (OUTPATIENT)
Age: 49
End: 2024-03-14
Payer: COMMERCIAL

## 2024-03-14 ENCOUNTER — INPATIENT (INPATIENT)
Facility: HOSPITAL | Age: 49
LOS: 1 days | Discharge: HOME | DRG: 378 | End: 2024-03-16
Attending: SURGERY | Admitting: SURGERY
Payer: COMMERCIAL

## 2024-03-14 VITALS
WEIGHT: 160.06 LBS | RESPIRATION RATE: 16 BRPM | TEMPERATURE: 98 F | OXYGEN SATURATION: 95 % | HEIGHT: 69 IN | SYSTOLIC BLOOD PRESSURE: 144 MMHG | HEART RATE: 100 BPM | DIASTOLIC BLOOD PRESSURE: 88 MMHG

## 2024-03-14 DIAGNOSIS — K92.2 GASTROINTESTINAL HEMORRHAGE, UNSPECIFIED: ICD-10-CM

## 2024-03-14 LAB
ALBUMIN SERPL ELPH-MCNC: 4.6 G/DL — SIGNIFICANT CHANGE UP (ref 3.3–5)
ALP SERPL-CCNC: 117 U/L — SIGNIFICANT CHANGE UP (ref 40–120)
ALT FLD-CCNC: 23 U/L — SIGNIFICANT CHANGE UP (ref 10–45)
ANION GAP SERPL CALC-SCNC: 11 MMOL/L — SIGNIFICANT CHANGE UP (ref 5–17)
APTT BLD: 34.8 SEC — SIGNIFICANT CHANGE UP (ref 24.5–35.6)
AST SERPL-CCNC: 26 U/L — SIGNIFICANT CHANGE UP (ref 10–40)
BASOPHILS # BLD AUTO: 0.07 K/UL — SIGNIFICANT CHANGE UP (ref 0–0.2)
BASOPHILS NFR BLD AUTO: 1.1 % — SIGNIFICANT CHANGE UP (ref 0–2)
BILIRUB SERPL-MCNC: 0.6 MG/DL — SIGNIFICANT CHANGE UP (ref 0.2–1.2)
BUN SERPL-MCNC: 9 MG/DL — SIGNIFICANT CHANGE UP (ref 7–23)
CALCIUM SERPL-MCNC: 10.1 MG/DL — SIGNIFICANT CHANGE UP (ref 8.4–10.5)
CHLORIDE SERPL-SCNC: 105 MMOL/L — SIGNIFICANT CHANGE UP (ref 96–108)
CO2 SERPL-SCNC: 23 MMOL/L — SIGNIFICANT CHANGE UP (ref 22–31)
CREAT SERPL-MCNC: 0.59 MG/DL — SIGNIFICANT CHANGE UP (ref 0.5–1.3)
EGFR: 110 ML/MIN/1.73M2 — SIGNIFICANT CHANGE UP
EOSINOPHIL # BLD AUTO: 0.04 K/UL — SIGNIFICANT CHANGE UP (ref 0–0.5)
EOSINOPHIL NFR BLD AUTO: 0.6 % — SIGNIFICANT CHANGE UP (ref 0–6)
GLUCOSE SERPL-MCNC: 111 MG/DL — HIGH (ref 70–99)
HCT VFR BLD CALC: 45.2 % — HIGH (ref 34.5–45)
HGB BLD-MCNC: 15 G/DL — SIGNIFICANT CHANGE UP (ref 11.5–15.5)
IMM GRANULOCYTES NFR BLD AUTO: 0.3 % — SIGNIFICANT CHANGE UP (ref 0–0.9)
INR BLD: 0.96 RATIO — SIGNIFICANT CHANGE UP (ref 0.85–1.18)
LYMPHOCYTES # BLD AUTO: 0.64 K/UL — LOW (ref 1–3.3)
LYMPHOCYTES # BLD AUTO: 10.1 % — LOW (ref 13–44)
MCHC RBC-ENTMCNC: 31.3 PG — SIGNIFICANT CHANGE UP (ref 27–34)
MCHC RBC-ENTMCNC: 33.2 GM/DL — SIGNIFICANT CHANGE UP (ref 32–36)
MCV RBC AUTO: 94.2 FL — SIGNIFICANT CHANGE UP (ref 80–100)
MONOCYTES # BLD AUTO: 0.47 K/UL — SIGNIFICANT CHANGE UP (ref 0–0.9)
MONOCYTES NFR BLD AUTO: 7.4 % — SIGNIFICANT CHANGE UP (ref 2–14)
NEUTROPHILS # BLD AUTO: 5.08 K/UL — SIGNIFICANT CHANGE UP (ref 1.8–7.4)
NEUTROPHILS NFR BLD AUTO: 80.5 % — HIGH (ref 43–77)
NRBC # BLD: 0 /100 WBCS — SIGNIFICANT CHANGE UP (ref 0–0)
PLATELET # BLD AUTO: 242 K/UL — SIGNIFICANT CHANGE UP (ref 150–400)
POTASSIUM SERPL-MCNC: 3.6 MMOL/L — SIGNIFICANT CHANGE UP (ref 3.5–5.3)
POTASSIUM SERPL-SCNC: 3.6 MMOL/L — SIGNIFICANT CHANGE UP (ref 3.5–5.3)
PROT SERPL-MCNC: 7.8 G/DL — SIGNIFICANT CHANGE UP (ref 6–8.3)
PROTHROM AB SERPL-ACNC: 10.1 SEC — SIGNIFICANT CHANGE UP (ref 9.5–13)
RBC # BLD: 4.8 M/UL — SIGNIFICANT CHANGE UP (ref 3.8–5.2)
RBC # FLD: 11.9 % — SIGNIFICANT CHANGE UP (ref 10.3–14.5)
SODIUM SERPL-SCNC: 139 MMOL/L — SIGNIFICANT CHANGE UP (ref 135–145)
WBC # BLD: 6.32 K/UL — SIGNIFICANT CHANGE UP (ref 3.8–10.5)
WBC # FLD AUTO: 6.32 K/UL — SIGNIFICANT CHANGE UP (ref 3.8–10.5)

## 2024-03-14 PROCEDURE — 99285 EMERGENCY DEPT VISIT HI MDM: CPT

## 2024-03-14 PROCEDURE — 74174 CTA ABD&PLVS W/CONTRAST: CPT | Mod: 26,MC

## 2024-03-14 PROCEDURE — 45378 DIAGNOSTIC COLONOSCOPY: CPT

## 2024-03-14 RX ORDER — SODIUM CHLORIDE 9 MG/ML
1000 INJECTION, SOLUTION INTRAVENOUS
Refills: 0 | Status: DISCONTINUED | OUTPATIENT
Start: 2024-03-14 | End: 2024-03-16

## 2024-03-14 RX ORDER — SOD SULF/SODIUM/NAHCO3/KCL/PEG
4000 SOLUTION, RECONSTITUTED, ORAL ORAL ONCE
Refills: 0 | Status: COMPLETED | OUTPATIENT
Start: 2024-03-14 | End: 2024-03-14

## 2024-03-14 RX ADMIN — Medication 4000 MILLILITER(S): at 18:13

## 2024-03-14 NOTE — ED PROVIDER NOTE - PHYSICAL EXAMINATION
Const: Awake, alert, no acute distress.  Well appearing.  Moving comfortably on stretcher.  Cardiac: Regular rate and regular rhythm. S1 S2 present.  Peripheral pulses 2+ and symmetric. No LE edema.  Resp: Speaking in full sentences. No evidence of respiratory distress.  Breath sounds clear to auscultation b/l. Normal chest excursion.   Abd: Non-distended, no overlying skin changes.  Soft, non-tender, no guarding, no rigidity, no rebound tenderness.  No palpable masses.  Normal bowel sounds in all 4 quadrants.  Skin: Normal coloration.  No rashes, abrasions or lacerations.  Neuro: Awake, alert & oriented x 3.  Moves all extremities spontaneously.  No focal deficits. Const: Awake, alert, no acute distress.  Well appearing.  Moving comfortably on stretcher.  Cardiac: Regular rate and regular rhythm. S1 S2 present.  Peripheral pulses 2+ and symmetric. No LE edema.  Resp: Speaking in full sentences. No evidence of respiratory distress.  Breath sounds clear to auscultation b/l. Normal chest excursion.   Abd: Non-distended, no overlying skin changes.  Soft, non-tender, no guarding, no rigidity, no rebound tenderness.  No palpable masses.  Normal bowel sounds in all 4 quadrants.  Skin: Normal coloration.  No rashes, abrasions or lacerations.  Neuro: Awake, alert & oriented x 3.  Moves all extremities spontaneously.  No focal deficits.    Attending note.  Agree with above.  Physical examination was observed directly.

## 2024-03-14 NOTE — ED ADULT TRIAGE NOTE - CHIEF COMPLAINT QUOTE
blood in stool while taking colonoscopy prep last night  received colonoscopy today and was sent to ED for further evaluation of "rectal bleeding and blood clots throughout entire colon"  per papers from office, received 500NS and 400mg of propofol during procedure, patient currently A+Ox3

## 2024-03-14 NOTE — ED ADULT NURSE NOTE - OBJECTIVE STATEMENT
49 y/o female with PMH of Ulcerative Colitis arrives to the ER complaining of rectal bleeding. Pt reports blood in stool while taking colonoscopy prep last night, Today had a colonoscopy was found to have "large amount of dark blood and blood clots throughout entire colon" and was sent to ED for further evaluation. As per papers from office, received 500 NS and 400 mg of propofol for deep sedation during procedure, 22 G R wrist on placed.  Pt denies SOB, chest pain, N/V/D.  On assessment pt is well appearing, A&Ox4, speaking coherently, airway is patent, breathing spontaneously and unlabored. Skin is dry, warm. Safety and comfort measures provided to keep patient safe. Bed placed in lowest position and side rails raised. Pt oriented to call bell system.  IV site assessed and remains WDL.

## 2024-03-14 NOTE — H&P ADULT - ASSESSMENT
48year old female with PMH of UC on daily mesalamine presented after outpatient colonoscopy on 3/14 with acute rectal bleeding x 1 day. 3/14 colonoscopy able to reach cecum, but unable to fully visualize colonic mucosa due to presence of blood and clots in the lumen, unable to reach TI. No abd pain, HDS, abd benign, H/H normal. CT: possible mass in transverse colon.     PLAN:  -Admit to Dr Soto   - CLD  - NPO@midnight  - AM labs: pre-op for colonoscopy  - Bowel prep  - Colonoscopy WIth GI tomorrow  - CEA      Discussed with Dr Brittany Wallace PGY2  Green Team surgery  [id number]   "theyre scraping my prostate"

## 2024-03-14 NOTE — ED PROVIDER NOTE - DIFFERENTIAL DIAGNOSIS
Differential Diagnosis GI bleeding with differential not limited to upper GI bleed versus exacerbation of ulcerative colitis versus AVM versus other colitis.  Versus ulcer

## 2024-03-14 NOTE — H&P ADULT - NSHPPHYSICALEXAM_GEN_ALL_CORE
LOS:     VITALS:   T(C): 36.8 (03-14-24 @ 18:10), Max: 36.8 (03-14-24 @ 18:10)  HR: 78 (03-14-24 @ 18:10) (78 - 100)  BP: 118/94 (03-14-24 @ 18:10) (118/94 - 151/99)  RR: 16 (03-14-24 @ 18:10) (16 - 20)  SpO2: 98% (03-14-24 @ 18:10) (95% - 100%)    GENERAL: NAD, lying in bed comfortably  CHEST/LUNG:  Unlabored respirations  HEART: Regular rate and rhythm  ABDOMEN: Soft, nontender, nondistended

## 2024-03-14 NOTE — ED ADULT NURSE REASSESSMENT NOTE - NS ED NURSE REASSESS COMMENT FT1
GOLYTELY given to pt. Pt instructed to drink 24 ml every 10 minutes.  Pt has a commode at bedside, tissue paper, bed placed in lowest position, call bell close to reach, VSS

## 2024-03-14 NOTE — ED ADULT NURSE NOTE - NSFALLUNIVINTERV_ED_ALL_ED
Bed/Stretcher in lowest position, wheels locked, appropriate side rails in place/Call bell, personal items and telephone in reach/Instruct patient to call for assistance before getting out of bed/chair/stretcher/Non-slip footwear applied when patient is off stretcher/[place] to call system/Physically safe environment - no spills, clutter or unnecessary equipment/Purposeful proactive rounding/Room/bathroom lighting operational, light cord in reach

## 2024-03-14 NOTE — H&P ADULT - NSHPLABSRESULTS_GEN_ALL_CORE
LABS:                        15.0   6.32  )-----------( 242      ( 14 Mar 2024 10:50 )             45.2     03-14    139  |  105  |  9   ----------------------------<  111<H>  3.6   |  23  |  0.59    Ca    10.1      14 Mar 2024 10:50    TPro  7.8  /  Alb  4.6  /  TBili  0.6  /  DBili  x   /  AST  26  /  ALT  23  /  AlkPhos  117  03-14    PT/INR - ( 14 Mar 2024 10:50 )   PT: 10.1 sec;   INR: 0.96 ratio         PTT - ( 14 Mar 2024 10:50 )  PTT:34.8 sec      Urinalysis Basic - ( 14 Mar 2024 10:50 )    Color: x / Appearance: x / SG: x / pH: x  Gluc: 111 mg/dL / Ketone: x  / Bili: x / Urobili: x   Blood: x / Protein: x / Nitrite: x   Leuk Esterase: x / RBC: x / WBC x   Sq Epi: x / Non Sq Epi: x / Bacteria: x

## 2024-03-14 NOTE — H&P ADULT - HISTORY OF PRESENT ILLNESS
48year old female with PMH of UC  Diagnosed in 2021on daily mesalamine presented after outpatient colonoscopy on 3/14 with acute rectal bleeding x 1 day. Patient developed acute painless hematochezia around 2 AM after drinking bowel prep for scheduled surveillance colonoscopy for UC. She underwent colonoscopy with Dr. Singh on 3/14: able to reach cecum, but unable to fully visualize colonic mucosa due to presence of blood and clots in the lumen, unable to reach TI for full examination. Patient was advised to come to NS for further GI evaluation.   Patient  feeling a bit tired after recent sedation, but otherwise no acute symptoms.   Patient denied fevers, chills, eye pain, visual changes, rashes, dysphagia, odynophagia, abdominal pain, nausea, vomiting, prior melena, hematemesis, change in stool caliber, or family history of GI-related cancers or IBD.  Last complete colonoscopy was 1 year ago, small polyp found, Last flare on 2021, resolved with short course of steroids. No steroids since 2021.    In ED: Denies abd pain, HDS, abd benign, H/H normal. CT: possible mass in transverse colon

## 2024-03-14 NOTE — PATIENT PROFILE ADULT - FALL HARM RISK - UNIVERSAL INTERVENTIONS
Bed in lowest position, wheels locked, appropriate side rails in place/Call bell, personal items and telephone in reach/Instruct patient to call for assistance before getting out of bed or chair/Non-slip footwear when patient is out of bed/[place] to call system/Physically safe environment - no spills, clutter or unnecessary equipment/Purposeful Proactive Rounding/Room/bathroom lighting operational, light cord in reach

## 2024-03-14 NOTE — CONSULT NOTE ADULT - ATTENDING COMMENTS
Patient seen and examined. Agree w above.  at bedside. UC. Patient reporting dark and bright blood from the rectum after ingesting 2nd half of miralax prep this am in preparation for screening colonoscopy. During colonoscopy, patient found to have blood and clots throughout. Patient sent to ER for evaluation of active GI bleed. Hemodynamically stable. CT without active extravasation but lesion in TC suspicious. Rec EGD/colonoscopy tomorrow. Risks/benefits explained to patient and .

## 2024-03-14 NOTE — H&P ADULT - ATTENDING COMMENTS
Seen in the OR for intraop consult.  Anterior wound identified in the anterior rectum.  Lap loop colostomy created with Dr Morales.   Full intraop consult will also be dictated. Pt with h/o UC,   Bleeding after bowel prep.  Now bleeding has resolved.  CT results noted. Will f/u on colonoscopy results.

## 2024-03-14 NOTE — ED PROVIDER NOTE - OBJECTIVE STATEMENT
48-year-old female with history of ulcerative colitis presents to the ED with rectal bleeding.  Patient says she was scheduled for a routine colonoscopy today.  Patient says however last night after drinking the prep she started noticing rectal bleeding that she described as bright red blood.  Patient states during exam general.  Speaking with Dr. Herbert, she endorses that patient did not have any active bleeding so she is unaware of the source.  Patient denies any chest pain, shortness of breath, weakness, fatigue, abdominal pain, bleeding from anywhere else, dysuria, hematuria. 48-year-old female with history of ulcerative colitis presents to the ED with rectal bleeding.  Patient says she was scheduled for a routine colonoscopy today.  Patient says however last night after drinking the prep she started noticing rectal bleeding that she described as bright red blood.  Patient states during exam general.  Speaking with Dr. Herbert, she endorses that patient did not have any active bleeding so she is unaware of the source.  Patient denies any chest pain, shortness of breath, weakness, fatigue, abdominal pain, bleeding from anywhere else, dysuria, hematuria.     Attending note.  Patient was seen in room #10.  Agree with above.  Patient has a history of ulcerative colitis and was getting annual colonoscopy prep last night when she began to have dark red blood per rectum.  She denies any abdominal pain, cramping, lightheadedness, dizziness, chest pain, shortness of breath or dyspnea on exertion.  Patient underwent colonoscopy and dark blood and clots were found in the colon.  Patient was sent to the emergency department for further evaluation by GI.  Patient has a history of ulcerative colitis since the summer 2021 and takes mesalamine daily.  She is not currently on steroids.  She has no allergies to medication.

## 2024-03-14 NOTE — CONSULT NOTE ADULT - ASSESSMENT
48F with UC on daily mesalamine presented after outpatient colonoscopy on 3/14 with acute rectal bleeding x 1 day.     #Ulcerative colitis  - Had acute onset of blood diarrhea x 10 days in 8/2021, admitted to [facility]  - Diagnosed with UC on 9/2021 colonoscopy: normal TI, diffuse discontinuous abnormal vascularity, scarring, pseudopolyps. Minimal friability. Several shallow ulcers in right colon, rectum normal. Biopsies showed TI w lymphoid aggregates, colon biopsies showed mild chronic active to quiescent colitis in all biopsies  - Follows with Dr. Pavon outpatient. Started on mesalamine and prednisone taper with symptomatic control. Steroids taper was continued for > 1 year with gradual taper.   - Had minor flare-up's on mesalamine and steroids taper, but not requiring hospitalization. No previous UC-related surgeries.   - Started on Zeposia 9/2022 via the patient assistance program, and was tapered off prednisone shortly afterwards. Has not required steroids treatment since.   - UC surveillance 2/2023 colonoscopy: normal TI, mild erythema / Jon 1 in whole colon, extensive pseudo polyposis. Path showing focal minimal crypt distortion, per path - quiescent colitis without evidence of active colitis or dysplasia.  - Symptomatically controlled on Zeposia until patient was unable to get coverage via patient assistance program in 11/2023. Back on mesalamine daily with symptomatic control.   - Baseline: 1-2x soft, non-bloody BM's daily, no abdominal pain, weight loss, change in PO intakes or other systemic symptoms.     #Hematochezia  - No abdominal pain or hematochezia before starting bowel prep 3/13.   - CTA A/P on 3/14 revealed masslike lesion in transverse colon, no active GIB. No known mass from 2/2023 colonoscopy.   - Hematochezia ddx: Dieulafoy's lesion vs. diverticular bleed vs. AVM's vs. UC; less likely colonic malignancy given recent colonoscopy finding, unless it's a rapidly growing mass. Mass-like lesion in transverse colon --> subepithelial hematoma? luminal blood clot?   - Hemodynamically stable, relatively asymptomatic, and normal Hgb, doesn't require PRBC  - 3/14 colonoscopy: Able to reach cecum, but unable to fully visualize colonic mucosa due to presence of blood and clots in the lumen. Unable to reach TI for full examination    Recommendations  - Will need further bowel prep due to blood in colon and poor visualization during outpatient scope  - Plan for EGD/colonoscopy tomorrow  - Clear liquids today  - Bowel prep to be ordered  - NPO after midnight  - Please obtain pre-procedure labs at 4AM  - Transfuse as needed for hgb goal > 7 or symptomatic anemia/active GIB  - Continue home mesalamine    Note incomplete until finalized by attending signature/attestation.    Sujey Romo  GI/Hepatology Fellow    MONDAY-FRIDAY 8AM-5PM:  Pager# [phone] ([facility]) or [phone] ([facility])    NON-URGENT CONSULTS:  Please email [email or web address]  AT NIGHT AND ON WEEKENDS:  Contact on-call GI fellow via answering service ([phone]) from 5pm-8am and on weekends/holidays

## 2024-03-14 NOTE — ED CLERICAL - NS ED CLERK NOTE PRE-ARRIVAL INFORMATION; ADDITIONAL PRE-ARRIVAL INFORMATION
CC/Reason For referral: COLONOSCOPY PROCEDURE TODAY 3/14 PATIENT HAD LOTS OF BLEEDING AND NEED EVALUATION   Preferred Consultant(if applicable):  Who admits for you (if needed):  Do you have documents you would like to fax over?  Would you still like to speak to an ED attending? Y

## 2024-03-14 NOTE — PATIENT PROFILE ADULT - HOME ACCESSIBILITY CONCERNS
none Referred To Asc For Closure Text (Leave Blank If You Do Not Want): After obtaining clear surgical margins the patient was sent to an ASC for surgical repair.  The patient understands they will receive post-surgical care and follow-up from the ASC physician.

## 2024-03-14 NOTE — ED ADULT NURSE NOTE - PAIN RATING/NUMBER SCALE (0-10): ACTIVITY
Dr. Taryn Mahan over RX for bupropion today as 150 mg 2 daily #30. Did she want #60 or 1 daily at #30? It is noted to be at 300 mg daily in last visit. RX sent as #60 now.
0 (no pain/absence of nonverbal indicators of pain)

## 2024-03-14 NOTE — ED PROVIDER NOTE - PROGRESS NOTE DETAILS
Shakira Deng MD (PGY1) Mass found on CT, reached out to GI via email and teams message. Awaiting response. Pt HD stable. Paged surgery. Yani Fernandez MD PGY-2: patient taken in sign out at time of shift change pending surgery evaluation; surgery will admit under Dr. Soto.

## 2024-03-14 NOTE — ED PROVIDER NOTE - CLINICAL SUMMARY MEDICAL DECISION MAKING FREE TEXT BOX
48-year-old female with history of ulcerative colitis presents to the ED with rectal bleeding.  Patient says she was scheduled for a routine colonoscopy today.  Patient says however last night after drinking the prep she started noticing rectal bleeding that she described as bright red blood.  Patient states during exam general.  Speaking with Dr. Herbert, she endorses that patient did not have any active bleeding so she is unaware of the source.  Patient denies any chest pain, shortness of breath, weakness, fatigue, abdominal pain, bleeding from anywhere else, dysuria, hematuria. DDx includes active bleeding, diverticulosis.  Orders include labs and CT angio abdomen.  Dispo pending labs, imaging and reassessment. 48-year-old female with history of ulcerative colitis presents to the ED with rectal bleeding.  Patient says she was scheduled for a routine colonoscopy today.  Patient says however last night after drinking the prep she started noticing rectal bleeding that she described as bright red blood.  Patient states during exam general.  Speaking with Dr. Herbert, she endorses that patient did not have any active bleeding so she is unaware of the source.  Patient denies any chest pain, shortness of breath, weakness, fatigue, abdominal pain, bleeding from anywhere else, dysuria, hematuria. DDx includes active bleeding, diverticulosis.  Orders include labs and CT angio abdomen.  Dispo pending labs, imaging and reassessment.      Attending note.  Agree with above.  Patient undergoing preparation for annual colonoscopy when she developed rectal bleeding.  Colonoscopy showed blood and clots in the colon.  Patient sent to the emergency department by GI for further evaluation including CT angiogram of abdomen pelvis.  Labs, lipase, type and screen, coags.

## 2024-03-14 NOTE — CONSULT NOTE ADULT - SUBJECTIVE AND OBJECTIVE BOX
HPI:  MARIANN HARDWICK is a 48year old female with UC on daily mesalamine presented after outpatient colonoscopy on 3/14 with acute rectal bleeding x 1 day.     Patient developed acute painless hematochezia around 2 AM after drinking bowel prep for scheduled colonoscopy for UC surveillance. She underwent colonoscopy with Dr. Singh on 3/14, able to reach cecum, but unable to fully visualize colonic mucosa due to presence of blood and clots in the lumen. Unable to reach TI for full examination. Patient was advised to come to NS for further GI evaluation.     After arrival to ED, patient had another BM with a small amount of darker-colored blood. Feeling a bit tired after recent sedation, but otherwise no acute symptoms.     Patient denied fevers, chills, eye pain, visual changes, rashes, dysphagia, odynophagia, abdominal pain, nausea, vomiting, prior melena, hematemesis, change in stool caliber, or family history of GI-related cancers or IBD.    See below "assessment" for detailed UC-related hx.    PMHX/PSHX:      Allergies:  No Known Allergies      Home Medications: reviewed  Hospital Medications:      Social History:   Tobacco: denies  Alcohol: denies  Recreational drugs: denies      PHYSICAL EXAM:   Vital Signs:  Vital Signs Last 24 Hrs  T(C): 36.7 (14 Mar 2024 10:07), Max: 36.7 (14 Mar 2024 10:07)  T(F): 98.1 (14 Mar 2024 10:07), Max: 98.1 (14 Mar 2024 10:07)  HR: 84 (14 Mar 2024 13:48) (84 - 100)  BP: 138/92 (14 Mar 2024 13:48) (138/92 - 151/99)  BP(mean): --  RR: 18 (14 Mar 2024 13:48) (16 - 20)  SpO2: 100% (14 Mar 2024 13:48) (95% - 100%)    Parameters below as of 14 Mar 2024 13:48  Patient On (Oxygen Delivery Method): room air      Daily Height in cm: 175.26 (14 Mar 2024 10:07)    Daily     GENERAL: no acute distress  NEURO: alert and oriented x 3  HEENT: NCAT, no conjunctival pallor appreciated; anicteric sclera  CHEST: no respiratory distress, no accessory muscle use  CARDIAC: regular rate, +S1/S2  ABDOMEN: soft, nontender, no rebound or guarding;  EXTREMITIES: warm, well perfused  SKIN: no lesions noted    LABS: reviewed                        15.0   6.32  )-----------( 242      ( 14 Mar 2024 10:50 )             45.2     03-14    139  |  105  |  9   ----------------------------<  111<H>  3.6   |  23  |  0.59    Ca    10.1      14 Mar 2024 10:50    TPro  7.8  /  Alb  4.6  /  TBili  0.6  /  DBili  x   /  AST  26  /  ALT  23  /  AlkPhos  117  03-14    LIVER FUNCTIONS - ( 14 Mar 2024 10:50 )  Alb: 4.6 g/dL / Pro: 7.8 g/dL / ALK PHOS: 117 U/L / ALT: 23 U/L / AST: 26 U/L / GGT: x             < from: CT Angio Abdomen and Pelvis w/ IV Cont (03.14.24 @ 12:32) >  FINDINGS:    LOWER CHEST: Within normal limits.    LIVER: Within normal limits.  BILE DUCTS: Normal caliber.  GALLBLADDER: Within normal limits.  SPLEEN: Within normal limits.  PANCREAS: Within normal limits.  ADRENALS: Within normal limits.  KIDNEYS/URETERS: Within normal limits.    BLADDER: Within normal limits.  REPRODUCTIVE ORGANS: Within normal limits.    BOWEL: No bowel obstruction. The appendix is normal.  Questionable   masslike thickening in the transverse colon on series 16 image 74.  PERITONEUM: No ascites.  VESSELS:  Within normal limits.  RETROPERITONEUM/LYMPH NODES: No lymphadenopathy.  ABDOMINAL WALL: Within normal limits.  BONES: Within normal limits.    IMPRESSION: Questionable transverse colon mass; recommend GI consultation.    No evidence of active GI bleeding.        --- End of Report ---    < end of copied text >

## 2024-03-15 ENCOUNTER — RESULT REVIEW (OUTPATIENT)
Age: 49
End: 2024-03-15

## 2024-03-15 DIAGNOSIS — K21.00 GASTRO-ESOPHAGEAL REFLUX DISEASE WITH ESOPHAGITIS, WITHOUT BLEEDING: ICD-10-CM

## 2024-03-15 LAB
ANION GAP SERPL CALC-SCNC: 15 MMOL/L — SIGNIFICANT CHANGE UP (ref 5–17)
APTT BLD: 33.3 SEC — SIGNIFICANT CHANGE UP (ref 24.5–35.6)
BLD GP AB SCN SERPL QL: NEGATIVE — SIGNIFICANT CHANGE UP
BUN SERPL-MCNC: 8 MG/DL — SIGNIFICANT CHANGE UP (ref 7–23)
CALCIUM SERPL-MCNC: 10 MG/DL — SIGNIFICANT CHANGE UP (ref 8.4–10.5)
CHLORIDE SERPL-SCNC: 103 MMOL/L — SIGNIFICANT CHANGE UP (ref 96–108)
CO2 SERPL-SCNC: 21 MMOL/L — LOW (ref 22–31)
CREAT SERPL-MCNC: 0.59 MG/DL — SIGNIFICANT CHANGE UP (ref 0.5–1.3)
EGFR: 110 ML/MIN/1.73M2 — SIGNIFICANT CHANGE UP
GLUCOSE SERPL-MCNC: 89 MG/DL — SIGNIFICANT CHANGE UP (ref 70–99)
HCT VFR BLD CALC: 38.6 % — SIGNIFICANT CHANGE UP (ref 34.5–45)
HGB BLD-MCNC: 13.1 G/DL — SIGNIFICANT CHANGE UP (ref 11.5–15.5)
INR BLD: 0.93 RATIO — SIGNIFICANT CHANGE UP (ref 0.85–1.18)
MCHC RBC-ENTMCNC: 31.6 PG — SIGNIFICANT CHANGE UP (ref 27–34)
MCHC RBC-ENTMCNC: 33.9 GM/DL — SIGNIFICANT CHANGE UP (ref 32–36)
MCV RBC AUTO: 93.2 FL — SIGNIFICANT CHANGE UP (ref 80–100)
NRBC # BLD: 0 /100 WBCS — SIGNIFICANT CHANGE UP (ref 0–0)
PLATELET # BLD AUTO: 205 K/UL — SIGNIFICANT CHANGE UP (ref 150–400)
POTASSIUM SERPL-MCNC: 3.6 MMOL/L — SIGNIFICANT CHANGE UP (ref 3.5–5.3)
POTASSIUM SERPL-SCNC: 3.6 MMOL/L — SIGNIFICANT CHANGE UP (ref 3.5–5.3)
PROTHROM AB SERPL-ACNC: 10.3 SEC — SIGNIFICANT CHANGE UP (ref 9.5–13)
RBC # BLD: 4.14 M/UL — SIGNIFICANT CHANGE UP (ref 3.8–5.2)
RBC # FLD: 12 % — SIGNIFICANT CHANGE UP (ref 10.3–14.5)
RH IG SCN BLD-IMP: NEGATIVE — SIGNIFICANT CHANGE UP
SODIUM SERPL-SCNC: 139 MMOL/L — SIGNIFICANT CHANGE UP (ref 135–145)
WBC # BLD: 4.81 K/UL — SIGNIFICANT CHANGE UP (ref 3.8–10.5)
WBC # FLD AUTO: 4.81 K/UL — SIGNIFICANT CHANGE UP (ref 3.8–10.5)

## 2024-03-15 PROCEDURE — 88305 TISSUE EXAM BY PATHOLOGIST: CPT | Mod: 26

## 2024-03-15 PROCEDURE — 99223 1ST HOSP IP/OBS HIGH 75: CPT

## 2024-03-15 PROCEDURE — 43239 EGD BIOPSY SINGLE/MULTIPLE: CPT | Mod: GC

## 2024-03-15 RX ORDER — MESALAMINE 400 MG
1.5 TABLET, DELAYED RELEASE (ENTERIC COATED) ORAL DAILY
Refills: 0 | Status: DISCONTINUED | OUTPATIENT
Start: 2024-03-15 | End: 2024-03-16

## 2024-03-15 RX ORDER — ESOMEPRAZOLE MAGNESIUM 40 MG/1
40 CAPSULE, DELAYED RELEASE ORAL DAILY
Qty: 30 | Refills: 6 | Status: ACTIVE | COMMUNITY
Start: 2024-03-15 | End: 1900-01-01

## 2024-03-15 RX ORDER — POTASSIUM CHLORIDE 20 MEQ
20 PACKET (EA) ORAL ONCE
Refills: 0 | Status: COMPLETED | OUTPATIENT
Start: 2024-03-15 | End: 2024-03-15

## 2024-03-15 RX ORDER — SODIUM CHLORIDE 9 MG/ML
500 INJECTION INTRAMUSCULAR; INTRAVENOUS; SUBCUTANEOUS
Refills: 0 | Status: DISCONTINUED | OUTPATIENT
Start: 2024-03-15 | End: 2024-03-16

## 2024-03-15 RX ORDER — ACETAMINOPHEN 500 MG
650 TABLET ORAL ONCE
Refills: 0 | Status: COMPLETED | OUTPATIENT
Start: 2024-03-15 | End: 2024-03-15

## 2024-03-15 RX ORDER — LANOLIN ALCOHOL/MO/W.PET/CERES
3 CREAM (GRAM) TOPICAL AT BEDTIME
Refills: 0 | Status: DISCONTINUED | OUTPATIENT
Start: 2024-03-15 | End: 2024-03-16

## 2024-03-15 RX ADMIN — Medication 650 MILLIGRAM(S): at 22:35

## 2024-03-15 RX ADMIN — Medication 1.5 GRAM(S): at 23:10

## 2024-03-15 RX ADMIN — Medication 20 MILLIEQUIVALENT(S): at 18:33

## 2024-03-15 RX ADMIN — Medication 650 MILLIGRAM(S): at 22:05

## 2024-03-15 RX ADMIN — SODIUM CHLORIDE 100 MILLILITER(S): 9 INJECTION, SOLUTION INTRAVENOUS at 00:42

## 2024-03-15 RX ADMIN — Medication 3 MILLIGRAM(S): at 22:05

## 2024-03-15 NOTE — CHART NOTE - NSCHARTNOTEFT_GEN_A_CORE
Brief GI Note:    Patient underwent EGD/Colonoscopy today, details below. OK from GI perspective for discharge home today.    < from: Upper Endoscopy (03.15.24 @ 16:12) >    Findings:       The Z-line was regular and was found 40 cm from the incisors.       LA Grade A (one or more mucosal breaks less than 5 mm, not extending between tops of 2        mucosal folds) esophagitis with no bleeding was found in the lower third of the esophagus.       Localized mild inflammation characterized by erythema was found in the gastric antrum.        Biopsies were taken with a cold forceps for Helicobacter pylori testing.       The duodenal bulb and second portion of the duodenum were normal.                                                                                                        Impression:          - Z-line regular, 40 cm from the incisors.                       - LA Grade A esophagitis.                       - Gastritis. Biopsied.                       - Normal duodenal bulb and second portion of the duodenum.  Recommendation:      - Low residue diet today.                       - Await pathology results.                       - No active source of bleeding identified, continue with colonoscopy today    < end of copied text >    < from: Colonoscopy (03.15.24 @ 16:12) >    Findings:       Hemorrhoids were found on perianal exam.       Skin tags were found on perianal exam.       The mid rectum and distal rectum appeared normal. Biopsies were taken with a cold forceps for        histology.       Diffuse pseudopolyps and inflammation with friable mucosa was found in the mid sigmoid colon,        in the descending colon, at the splenic flexure, in the transverse colon, at the hepatic        flexure, in the ascending colon and in the cecum. Random biopsies were taken with a cold        forceps for histology in all the aforementioned locations.       There is no endoscopic evidence of bleeding, obvious mass or stricture in the entire colon.        However difficult to visualize a mass in setting of inflammation and moderate to severe        pseudopolyps.                                                                                                        Impression:          - Preparation of the colon was fair.                       - Hemorrhoids found on perianal exam.                       - Perianal skin tags found on perianal exam.                       - The mid rectum and distal rectum are normal. Biopsied.                       - Pseudopolyps in the mid sigmoid colon, in the descending colon, at the                        splenic flexure, in the transverse colon, at the hepatic flexure, in the                        ascending colon and in the cecum. Biopsied.  Recommendation:      - Low residue diet today.                       - Patient ok for discharge home from GI perspective                       - Patient will need close follow up with IBD-GI provider Dr. Bhavin Singh in                        1-2 weeks.        Yohan De Souza MD  PGY-5 GI/Hepatology Fellow

## 2024-03-15 NOTE — CHART NOTE - NSCHARTNOTEFT_GEN_A_CORE
POST-OP NOTE    MARIANN HARDWICK | [id number] | [facility] 2MON [address]    Procedure: s/p EGD/colonoscopy by GI.     Subjective: pt was examined bedside. Reports pain is controlled. Denies nausea, vomiting. Tolerated PO    Vital Signs Last 24 Hrs  T(C): 36.6 (16 Mar 2024 00:20), Max: 37.1 (15 Mar 2024 18:51)  T(F): 97.9 (16 Mar 2024 00:20), Max: 98.7 (15 Mar 2024 18:51)  HR: 81 (16 Mar 2024 00:20) (75 - 96)  BP: 127/80 (16 Mar 2024 00:20) (124/84 - 168/92)  BP(mean): --  RR: 18 (16 Mar 2024 00:20) (18 - 22)  SpO2: 96% (16 Mar 2024 00:20) (94% - 100%)    Parameters below as of 16 Mar 2024 00:20  Patient On (Oxygen Delivery Method): room air      I&O's Summary    14 Mar 2024 07:01  -  15 Mar 2024 07:00  --------------------------------------------------------  IN: 700 mL / OUT: 0 mL / NET: 700 mL    15 Mar 2024 07:01  -  16 Mar 2024 02:08  --------------------------------------------------------  IN: 900 mL / OUT: 400 mL / NET: 500 mL                            13.1   4.81  )-----------( 205      ( 15 Mar 2024 07:10 )             38.6     03-15    139  |  103  |  8   ----------------------------<  89  3.6   |  21<L>  |  0.59    Ca    10.0      15 Mar 2024 07:10    TPro  7.8  /  Alb  4.6  /  TBili  0.6  /  DBili  x   /  AST  26  /  ALT  23  /  AlkPhos  117  03-14   PT/INR - ( 15 Mar 2024 07:10 )   PT: 10.3 sec;   INR: 0.93 ratio         PTT - ( 15 Mar 2024 07:10 )  PTT:33.3 sec    PHYSICAL EXAM:  Gen: NAD  Resp: breathing easily, no stridor  CV: RRR  Abdomen: soft, nontender, nondistended  Skin: Incision c/d/i. Normal color, no rashes or lesions    A/P: POST-OP NOTE    MARIANN HARDWICK | [id number] | [facility] [address]MON [address]    Procedure: s/p EGD/colonoscopy by GI.     Subjective: pt was examined bedside. Pain is controlled. Reports no rectal bleeding. Denies nausea, vomiting. Tolerated PO.     Vital Signs Last 24 Hrs  T(C): 36.6 (16 Mar 2024 00:20), Max: 37.1 (15 Mar 2024 18:51)  T(F): 97.9 (16 Mar 2024 00:20), Max: 98.7 (15 Mar 2024 18:51)  HR: 81 (16 Mar 2024 00:20) (75 - 96)  BP: 127/80 (16 Mar 2024 00:20) (124/84 - 168/92)  BP(mean): --  RR: 18 (16 Mar 2024 00:20) (18 - 22)  SpO2: 96% (16 Mar 2024 00:20) (94% - 100%)    Parameters below as of 16 Mar 2024 00:20  Patient On (Oxygen Delivery Method): room air      I&O's Summary    14 Mar 2024 07:01  -  15 Mar 2024 07:00  --------------------------------------------------------  IN: 700 mL / OUT: 0 mL / NET: 700 mL    15 Mar 2024 07:01  -  16 Mar 2024 02:08  --------------------------------------------------------  IN: 900 mL / OUT: 400 mL / NET: 500 mL                            13.1   4.81  )-----------( 205      ( 15 Mar 2024 07:10 )             38.6     03-15    139  |  103  |  8   ----------------------------<  89  3.6   |  21<L>  |  0.59    Ca    10.0      15 Mar 2024 07:10    TPro  7.8  /  Alb  4.6  /  TBili  0.6  /  DBili  x   /  AST  26  /  ALT  23  /  AlkPhos  117  03-14   PT/INR - ( 15 Mar 2024 07:10 )   PT: 10.3 sec;   INR: 0.93 ratio         PTT - ( 15 Mar 2024 07:10 )  PTT:33.3 sec    PHYSICAL EXAM:  Gen: NAD  Resp: breathing easily, no stridor  CV: RRR  Abdomen: soft, nontender, nondistended      A/P: s/p EGD/colonoscopy by GI. pt is recovering appropriately.     - LRD  - appreciate GI rec  - encourage OOBAT  - am labs

## 2024-03-15 NOTE — PRE PROCEDURE NOTE - PRE PROCEDURE EVALUATION
Attending Physician:  Dr Fisher                            Procedure: EGD/colonoscopy    Indication for Procedure: GIB  ________________________________________________________  PAST MEDICAL & SURGICAL HISTORY:  Ulcerative colitis        ALLERGIES:  No Known Allergies    HOME MEDICATIONS:    AICD/PPM: [ ] yes   [ x] no    PERTINENT LAB DATA:                        13.1   4.81  )-----------( 205      ( 15 Mar 2024 07:10 )             38.6     03-15    139  |  103  |  8   ----------------------------<  89  3.6   |  21<L>  |  0.59    Ca    10.0      15 Mar 2024 07:10    TPro  7.8  /  Alb  4.6  /  TBili  0.6  /  DBili  x   /  AST  26  /  ALT  23  /  AlkPhos  117  03-14    PT/INR - ( 15 Mar 2024 07:10 )   PT: 10.3 sec;   INR: 0.93 ratio         PTT - ( 15 Mar 2024 07:10 )  PTT:33.3 sec            PHYSICAL EXAMINATION:    Height (cm): 175.3  Weight (kg): 72.6  BMI (kg/m2): 23.6  BSA (m2): 1.88T(C): 36.7  HR: 91  BP: 124/91  RR: 18  SpO2: 97%    Constitutional: NAD    Neck:  No JVD  Respiratory: NO RESP DISTRESS   Cardiovascular: RRR  Extremities: No peripheral edema  Neurological: A/O x 4, no focal deficits        COMMENTS:    The patient is a suitable candidate for the planned procedure unless box checked [ ]  No, explain:

## 2024-03-16 ENCOUNTER — TRANSCRIPTION ENCOUNTER (OUTPATIENT)
Age: 49
End: 2024-03-16

## 2024-03-16 VITALS
TEMPERATURE: 98 F | HEART RATE: 86 BPM | RESPIRATION RATE: 18 BRPM | SYSTOLIC BLOOD PRESSURE: 160 MMHG | OXYGEN SATURATION: 97 % | DIASTOLIC BLOOD PRESSURE: 91 MMHG

## 2024-03-16 LAB
ANION GAP SERPL CALC-SCNC: 11 MMOL/L — SIGNIFICANT CHANGE UP (ref 5–17)
BUN SERPL-MCNC: 8 MG/DL — SIGNIFICANT CHANGE UP (ref 7–23)
CALCIUM SERPL-MCNC: 9.6 MG/DL — SIGNIFICANT CHANGE UP (ref 8.4–10.5)
CHLORIDE SERPL-SCNC: 105 MMOL/L — SIGNIFICANT CHANGE UP (ref 96–108)
CO2 SERPL-SCNC: 24 MMOL/L — SIGNIFICANT CHANGE UP (ref 22–31)
CREAT SERPL-MCNC: 0.58 MG/DL — SIGNIFICANT CHANGE UP (ref 0.5–1.3)
EGFR: 110 ML/MIN/1.73M2 — SIGNIFICANT CHANGE UP
GLUCOSE SERPL-MCNC: 87 MG/DL — SIGNIFICANT CHANGE UP (ref 70–99)
HCT VFR BLD CALC: 38.8 % — SIGNIFICANT CHANGE UP (ref 34.5–45)
HGB BLD-MCNC: 12.6 G/DL — SIGNIFICANT CHANGE UP (ref 11.5–15.5)
MAGNESIUM SERPL-MCNC: 2 MG/DL — SIGNIFICANT CHANGE UP (ref 1.6–2.6)
MCHC RBC-ENTMCNC: 30.9 PG — SIGNIFICANT CHANGE UP (ref 27–34)
MCHC RBC-ENTMCNC: 32.5 GM/DL — SIGNIFICANT CHANGE UP (ref 32–36)
MCV RBC AUTO: 95.1 FL — SIGNIFICANT CHANGE UP (ref 80–100)
NRBC # BLD: 0 /100 WBCS — SIGNIFICANT CHANGE UP (ref 0–0)
PHOSPHATE SERPL-MCNC: 3.8 MG/DL — SIGNIFICANT CHANGE UP (ref 2.5–4.5)
PLATELET # BLD AUTO: 205 K/UL — SIGNIFICANT CHANGE UP (ref 150–400)
POTASSIUM SERPL-MCNC: 4 MMOL/L — SIGNIFICANT CHANGE UP (ref 3.5–5.3)
POTASSIUM SERPL-SCNC: 4 MMOL/L — SIGNIFICANT CHANGE UP (ref 3.5–5.3)
RBC # BLD: 4.08 M/UL — SIGNIFICANT CHANGE UP (ref 3.8–5.2)
RBC # FLD: 11.9 % — SIGNIFICANT CHANGE UP (ref 10.3–14.5)
SODIUM SERPL-SCNC: 140 MMOL/L — SIGNIFICANT CHANGE UP (ref 135–145)
WBC # BLD: 5.4 K/UL — SIGNIFICANT CHANGE UP (ref 3.8–10.5)
WBC # FLD AUTO: 5.4 K/UL — SIGNIFICANT CHANGE UP (ref 3.8–10.5)

## 2024-03-16 PROCEDURE — 84100 ASSAY OF PHOSPHORUS: CPT

## 2024-03-16 PROCEDURE — 74174 CTA ABD&PLVS W/CONTRAST: CPT | Mod: MC

## 2024-03-16 PROCEDURE — 85025 COMPLETE CBC W/AUTO DIFF WBC: CPT

## 2024-03-16 PROCEDURE — 99238 HOSP IP/OBS DSCHRG MGMT 30/<: CPT | Mod: GC

## 2024-03-16 PROCEDURE — 36415 COLL VENOUS BLD VENIPUNCTURE: CPT

## 2024-03-16 PROCEDURE — 85027 COMPLETE CBC AUTOMATED: CPT

## 2024-03-16 PROCEDURE — 99285 EMERGENCY DEPT VISIT HI MDM: CPT

## 2024-03-16 PROCEDURE — 86850 RBC ANTIBODY SCREEN: CPT

## 2024-03-16 PROCEDURE — 85610 PROTHROMBIN TIME: CPT

## 2024-03-16 PROCEDURE — 80053 COMPREHEN METABOLIC PANEL: CPT

## 2024-03-16 PROCEDURE — 80048 BASIC METABOLIC PNL TOTAL CA: CPT

## 2024-03-16 PROCEDURE — 83735 ASSAY OF MAGNESIUM: CPT

## 2024-03-16 PROCEDURE — 88305 TISSUE EXAM BY PATHOLOGIST: CPT

## 2024-03-16 PROCEDURE — 86901 BLOOD TYPING SEROLOGIC RH(D): CPT

## 2024-03-16 PROCEDURE — 86900 BLOOD TYPING SEROLOGIC ABO: CPT

## 2024-03-16 PROCEDURE — 85730 THROMBOPLASTIN TIME PARTIAL: CPT

## 2024-03-16 RX ORDER — MESALAMINE 400 MG
4 TABLET, DELAYED RELEASE (ENTERIC COATED) ORAL
Qty: 0 | Refills: 0 | DISCHARGE
Start: 2024-03-16

## 2024-03-16 NOTE — DISCHARGE NOTE PROVIDER - CARE PROVIDER_API CALL
Lindy Lucero  Gastroenterology  [address] [place] [address], [facility] [address]  [place] [address]  Phone: [phone]  Fax: [phone]  Follow Up Time:

## 2024-03-16 NOTE — PROGRESS NOTE ADULT - SUBJECTIVE AND OBJECTIVE BOX
GREEN TEAM SURGERY DAILY PROGRESS NOTE    SUBJECTIVE:     Overnight: no acute events     Patient seen and evaluated on AM rounds.   Patient otherwise denies nausea, vomiting, chest pain, shortness of breath     OBJECTIVE:  Vital Signs Last 24 Hrs  T(C): 36.6 (16 Mar 2024 00:20), Max: 37.1 (15 Mar 2024 18:51)  T(F): 97.9 (16 Mar 2024 00:20), Max: 98.7 (15 Mar 2024 18:51)  HR: 81 (16 Mar 2024 00:20) (75 - 96)  BP: 127/80 (16 Mar 2024 00:20) (124/84 - 168/92)  BP(mean): --  RR: 18 (16 Mar 2024 00:20) (18 - 22)  SpO2: 96% (16 Mar 2024 00:20) (94% - 100%)    Parameters below as of 16 Mar 2024 00:20  Patient On (Oxygen Delivery Method): room air      Daily Height in cm: 175.26 (15 Mar 2024 15:47)    Daily   SRINIVASAN:      Chest Tube:      NG Tube:           STANDING  mesalamine ER (24-Hour) Capsule 1.5 Gram(s) Oral daily  sodium chloride 0.9%. 500 milliLiter(s) (30 mL/Hr) IV Continuous <Continuous>    PRN  melatonin 3 milliGRAM(s) Oral at bedtime PRN Insomnia      Labs:  139  |  103  |  8  ----------------------------<  89    (03-15)  3.6   |  21<L>  |  0.59          Ca    10.0      03-15  Mg    x  Phos  x          Urinalysis Basic - ( 15 Mar 2024 07:10 )    Color: x / Appearance: x / SG: x / pH: x  Gluc: 89 mg/dL / Ketone: x  / Bili: x / Urobili: x   Blood: x / Protein: x / Nitrite: x   Leuk Esterase: x / RBC: x / WBC x   Sq Epi: x / Non Sq Epi: x / Bacteria: x      Urinalysis Basic - ( 15 Mar 2024 07:10 )    Color: x / Appearance: x / SG: x / pH: x  Gluc: 89 mg/dL / Ketone: x  / Bili: x / Urobili: x   Blood: x / Protein: x / Nitrite: x   Leuk Esterase: x / RBC: x / WBC x   Sq Epi: x / Non Sq Epi: x / Bacteria: x          Physical Exam:  GENERAL: NAD, lying in bed comfortably  CHEST/LUNG:  Unlabored respirations  HEART: Regular rate and rhythm  ABDOMEN: Soft, nontender, nondistended
GREEN TEAM SURGERY DAILY PROGRESS NOTE    SUBJECTIVE:     Overnight: no acute events     Patient seen and evaluated on AM rounds.   Patient otherwise denies nausea, vomiting, chest pain, shortness of breath     OBJECTIVE:  Vital Signs Last 24 Hrs  T(C): 36.5 (14 Mar 2024 23:28), Max: 36.9 (14 Mar 2024 21:00)  T(F): 97.7 (14 Mar 2024 23:28), Max: 98.5 (14 Mar 2024 21:00)  HR: 91 (14 Mar 2024 23:28) (69 - 100)  BP: 149/90 (14 Mar 2024 23:28) (118/94 - 167/87)  BP(mean): --  RR: 18 (14 Mar 2024 23:28) (16 - 20)  SpO2: 96% (14 Mar 2024 23:28) (95% - 100%)    Parameters below as of 14 Mar 2024 23:28  Patient On (Oxygen Delivery Method): room air      Daily Height in cm: 175.26 (14 Mar 2024 10:07)    Daily   SRINIVASAN:    Chest Tube:    NG Tube:       STANDING  lactated ringers. 1000 milliLiter(s) (100 mL/Hr) IV Continuous <Continuous>    PRN      Labs:  139  |  105  |  9  ----------------------------<  111<H>    (03-14)  3.6   |  23  |  0.59          Ca    10.1      03-14  Mg    x  Phos  x          Urinalysis Basic - ( 14 Mar 2024 10:50 )    Color: x / Appearance: x / SG: x / pH: x  Gluc: 111 mg/dL / Ketone: x  / Bili: x / Urobili: x   Blood: x / Protein: x / Nitrite: x   Leuk Esterase: x / RBC: x / WBC x   Sq Epi: x / Non Sq Epi: x / Bacteria: x      Urinalysis Basic - ( 14 Mar 2024 10:50 )    Color: x / Appearance: x / SG: x / pH: x  Gluc: 111 mg/dL / Ketone: x  / Bili: x / Urobili: x   Blood: x / Protein: x / Nitrite: x   Leuk Esterase: x / RBC: x / WBC x   Sq Epi: x / Non Sq Epi: x / Bacteria: x          Physical Exam:  GENERAL: NAD, lying in bed comfortably  CHEST/LUNG:  Unlabored respirations  HEART: Regular rate and rhythm  ABDOMEN: Soft, nontender, nondistended

## 2024-03-16 NOTE — DISCHARGE NOTE NURSING/CASE MANAGEMENT/SOCIAL WORK - NSDCPEFALRISK_GEN_ALL_CORE
For information on Fall & Injury Prevention, visit: [email or web address] OR  [email or web address] OR  https://www.cdc.gov/steadi/patient.html

## 2024-03-16 NOTE — PROGRESS NOTE ADULT - ATTENDING COMMENTS
Feeling well after cscope yesterday, tolerating diet.  No abdominal pain, nausea.  Abd soft, non tender.  Vitals reviewed.    Dc home today  all questions answered    Linda Jeong MD
Pt with h/o UC,   Bleeding after bowel prep.  Now bleeding has resolved.  CT results noted. Will f/u on colonoscopy results.

## 2024-03-16 NOTE — DISCHARGE NOTE NURSING/CASE MANAGEMENT/SOCIAL WORK - PATIENT PORTAL LINK FT
You can access the FollowMyHealth Patient Portal offered by [facility] by registering at the following website: [email or web address]. By joining [facility]’s FollowMyHealth portal, you will also be able to view your health information using other applications (apps) compatible with our system.

## 2024-03-16 NOTE — PROGRESS NOTE ADULT - ASSESSMENT
48year old female with PMH of UC on daily mesalamine presented after outpatient colonoscopy on 3/14 with acute rectal bleeding x 1 day. 3/14 colonoscopy able to reach cecum, but unable to fully visualize colonic mucosa due to presence of blood and clots in the lumen, unable to reach TI. No abd pain, HDS, abd benign, H/H normal. CT: possible mass in transverse colon. Now, s/p EGD/colonoscopy by GI.       PLAN:  - LRD today  - appreciate GI rec: f/u with IBD-GI provider Dr. Bhavin Singh in 1-2 weeks.  - encourage OOBAT  - am labs  - f/u CEA      [facility] Team surgery  [id number]  
48year old female with PMH of UC on daily mesalamine presented after outpatient colonoscopy on 3/14 with acute rectal bleeding x 1 day. 3/14 colonoscopy able to reach cecum, but unable to fully visualize colonic mucosa due to presence of blood and clots in the lumen, unable to reach TI. No abd pain, HDS, abd benign, H/H normal. CT: possible mass in transverse colon.       PLAN:  - NPO/IVF  - AM labs: pre-op for colonoscopy  - Bowel prep  - Colonoscopy WIth GI tomorrow  - CEA      [place] Team surgery  [id number]

## 2024-03-16 NOTE — DISCHARGE NOTE PROVIDER - DISCHARGE SERVICE FOR PATIENT
Patient needs a follow up appointment on the discharge service for the patient. I have reviewed and made amendments to the documentation where necessary.

## 2024-03-16 NOTE — DISCHARGE NOTE PROVIDER - HOSPITAL COURSE
Maria Isabel Diaz is a 48year old female with PMH of UC  Diagnosed in 2021 on daily mesalamine presented after outpatient colonoscopy on 3/14 with acute rectal bleeding x 1 day. Patient developed acute painless hematochezia around 2 AM after drinking bowel prep for scheduled surveillance colonoscopy for UC. She underwent colonoscopy with Dr. Singh on 3/14: able to reach cecum, but unable to fully visualize colonic mucosa due to presence of blood and clots in the lumen, unable to reach TI for full examination. Patient was advised to come to NS for further GI evaluation.  In ED Patient was  feeling a bit tired after recent sedation, but otherwise no acute symptoms.  Patient denied fevers, chills, eye pain, visual changes, rashes, dysphagia, odynophagia, abdominal pain, nausea, vomiting, prior melena, hematemesis, change in stool caliber, or family history of GI-related cancers or IBD.  Last complete colonoscopy was 1 year ago, small polyp found, Last flare on 2021, resolved with short course of steroids. No steroids since 2021. CT 3/14 showed concern for possible mass in transverse colon. Surgery team was consulted and recommended Colonoscopy with GI and to get CEA lab. Patient underwent colonoscopy and EGD on 3/15, which showed no evidence of active bleeding, but pseudopolyps throughout the GI tract on Colonoscopy and esophagitis and gastritis on EGD. Biopsies were taken. On 3/14 patient was restarted on home dose mesalamine. On 3/16 patient was tolerating diet, with no complaints of pain, and no more bloody bowel movement and was subsequently discharged home. Patient instructed to follow up with GI team as outpatient.

## 2024-03-18 ENCOUNTER — RX RENEWAL (OUTPATIENT)
Age: 49
End: 2024-03-18

## 2024-04-05 PROBLEM — K51.90 ULCERATIVE COLITIS, UNSPECIFIED, WITHOUT COMPLICATIONS: Chronic | Status: ACTIVE | Noted: 2024-03-14

## 2024-04-08 ENCOUNTER — RX RENEWAL (OUTPATIENT)
Age: 49
End: 2024-04-08

## 2024-04-15 LAB
CRP SERPL-MCNC: 34 MG/L
HCT VFR BLD CALC: 36.4 %
HGB BLD-MCNC: 11.3 G/DL
MCHC RBC-ENTMCNC: 31 GM/DL
MCHC RBC-ENTMCNC: 31.2 PG
MCV RBC AUTO: 100.6 FL
PLATELET # BLD AUTO: 458 K/UL
RBC # BLD: 3.62 M/UL
RBC # FLD: 13.3 %
WBC # FLD AUTO: 10.13 K/UL

## 2024-04-16 ENCOUNTER — APPOINTMENT (OUTPATIENT)
Dept: GASTROENTEROLOGY | Facility: CLINIC | Age: 49
End: 2024-04-16
Payer: COMMERCIAL

## 2024-04-16 VITALS
HEART RATE: 89 BPM | OXYGEN SATURATION: 99 % | SYSTOLIC BLOOD PRESSURE: 120 MMHG | HEIGHT: 69 IN | WEIGHT: 160 LBS | BODY MASS INDEX: 23.7 KG/M2 | DIASTOLIC BLOOD PRESSURE: 79 MMHG

## 2024-04-16 PROCEDURE — 91110 GI TRC IMG INTRAL ESOPH-ILE: CPT

## 2024-04-16 NOTE — ASSESSMENT
[FreeTextEntry1] : Reason for visit. Patient is being seen for a capsule procedure study   Procedure note:  This is a pleasant  48  year old female with a history of anemia being seen for a procedure visit for small bowel capsule endoscopy  r/o possible bleeding   I have reviewed the risks, benefits, and alternatives of small bowel capsule endoscopy with the patient in detail. Risks include missed lesions, as well as capsule retention that could possibly result in bowel obstruction and necessitate surgical removal. Informed written consent was obtained prior to ingestion of the pill cam. The patient ingested the small bowel capsule without difficulty. He tolerated the procedure well without immediate complications. Real time video shows capsule in the stomach. The patient was instructed to contact the office with any questions or concerns.  Informed the patient that if he requires an MRI within 1 month that he will need an abdominal x-ray to confirm capsule exit.  Post capsule ingestion instructions were provided to the patient

## 2024-04-17 DIAGNOSIS — R19.7 DIARRHEA, UNSPECIFIED: ICD-10-CM

## 2024-04-17 RX ORDER — PREDNISONE 20 MG/1
20 TABLET ORAL DAILY
Qty: 28 | Refills: 0 | Status: ACTIVE | COMMUNITY
Start: 2024-04-12 | End: 1900-01-01

## 2024-04-30 RX ORDER — OZANIMOD HYDROCHLORIDE 0.92 MG/1
CAPSULE ORAL
Refills: 0 | Status: DISCONTINUED | COMMUNITY
End: 2024-04-30

## 2024-04-30 RX ORDER — SODIUM SULFATE, POTASSIUM SULFATE AND MAGNESIUM SULFATE 1.6; 3.13; 17.5 G/177ML; G/177ML; G/177ML
17.5-3.13-1.6 SOLUTION ORAL
Qty: 1 | Refills: 0 | Status: DISCONTINUED | COMMUNITY
Start: 2024-01-24 | End: 2024-04-30

## 2024-04-30 RX ORDER — POLYETHYLENE GLYCOL 3350 AND ELECTROLYTES WITH LEMON FLAVOR 236; 22.74; 6.74; 5.86; 2.97 G/4L; G/4L; G/4L; G/4L; G/4L
236 POWDER, FOR SOLUTION ORAL
Qty: 1 | Refills: 0 | Status: DISCONTINUED | COMMUNITY
Start: 2024-02-27 | End: 2024-04-30

## 2024-05-06 ENCOUNTER — APPOINTMENT (OUTPATIENT)
Dept: GASTROENTEROLOGY | Facility: CLINIC | Age: 49
End: 2024-05-06
Payer: COMMERCIAL

## 2024-05-06 VITALS
WEIGHT: 160 LBS | SYSTOLIC BLOOD PRESSURE: 134 MMHG | HEIGHT: 69 IN | DIASTOLIC BLOOD PRESSURE: 83 MMHG | BODY MASS INDEX: 23.7 KG/M2 | HEART RATE: 90 BPM | OXYGEN SATURATION: 100 %

## 2024-05-06 DIAGNOSIS — K51.90 ULCERATIVE COLITIS, UNSPECIFIED, W/OUT COMPLICATIONS: ICD-10-CM

## 2024-05-06 DIAGNOSIS — A08.11 ACUTE GASTROENTEROPATHY DUE TO NORWALK AGENT: ICD-10-CM

## 2024-05-06 DIAGNOSIS — D64.9 ANEMIA, UNSPECIFIED: ICD-10-CM

## 2024-05-06 PROCEDURE — G2211 COMPLEX E/M VISIT ADD ON: CPT

## 2024-05-06 PROCEDURE — 99214 OFFICE O/P EST MOD 30 MIN: CPT

## 2024-05-06 RX ORDER — PREDNISONE 10 MG/1
10 TABLET ORAL DAILY
Qty: 30 | Refills: 0 | Status: ACTIVE | COMMUNITY
Start: 2024-05-06 | End: 1900-01-01

## 2024-05-14 RX ORDER — VEDOLIZUMAB 300 MG/5ML
300 INJECTION, POWDER, LYOPHILIZED, FOR SOLUTION INTRAVENOUS
Refills: 0 | Status: ACTIVE | OUTPATIENT
Start: 2024-05-14 | End: 1900-01-01

## 2024-05-14 RX ADMIN — VEDOLIZUMAB 0 MG: 300 INJECTION, POWDER, LYOPHILIZED, FOR SOLUTION INTRAVENOUS at 00:00

## 2024-05-15 ENCOUNTER — TRANSCRIPTION ENCOUNTER (OUTPATIENT)
Age: 49
End: 2024-05-15

## 2024-05-15 PROBLEM — D64.9 ACUTE ANEMIA: Status: ACTIVE | Noted: 2024-05-15

## 2024-05-15 PROBLEM — A08.11 NOROVIRUS: Status: ACTIVE | Noted: 2024-05-15

## 2024-05-15 LAB
ALBUMIN SERPL ELPH-MCNC: 4.4 G/DL
ALP BLD-CCNC: 66 U/L
ALT SERPL-CCNC: 21 U/L
ANION GAP SERPL CALC-SCNC: 13 MMOL/L
AST SERPL-CCNC: 19 U/L
BILIRUB SERPL-MCNC: 0.3 MG/DL
BUN SERPL-MCNC: 16 MG/DL
CALCIUM SERPL-MCNC: 9.9 MG/DL
CHLORIDE SERPL-SCNC: 105 MMOL/L
CO2 SERPL-SCNC: 23 MMOL/L
CREAT SERPL-MCNC: 0.67 MG/DL
EGFR: 106 ML/MIN/1.73M2
FERRITIN SERPL-MCNC: 35 NG/ML
FOLATE SERPL-MCNC: >20 NG/ML
GLUCOSE SERPL-MCNC: 72 MG/DL
HCT VFR BLD CALC: 35.4 %
HGB BLD-MCNC: 10.2 G/DL
IRON SERPL-MCNC: 22 UG/DL
MCHC RBC-ENTMCNC: 28.8 GM/DL
MCHC RBC-ENTMCNC: 29.8 PG
MCV RBC AUTO: 103.5 FL
PLATELET # BLD AUTO: 363 K/UL
POTASSIUM SERPL-SCNC: 3.7 MMOL/L
PROT SERPL-MCNC: 6.3 G/DL
RBC # BLD: 3.42 M/UL
RBC # FLD: 16.5 %
SODIUM SERPL-SCNC: 141 MMOL/L
VIT B12 SERPL-MCNC: 1249 PG/ML
WBC # FLD AUTO: 7.44 K/UL

## 2024-05-15 NOTE — HISTORY OF PRESENT ILLNESS
[FreeTextEntry1] : Here with  for f/u visit  Was admitted to OSH over the weekend  Initially went to urgent care for pain after a fall, subsequently transferred to ED  Hgb 6's range No hematochezia at the time, had resolved after starting steroids She was given prbc transfusions and d/c'd home   Here now w   Feels better Less fatigued after receiving transfusions Having nonbloody formed bowel movements  Denies loose stools or blood in stool Denies abd pain, n/v, fevers, chills  Taking prednisone 40mg po qday still   States she had stool studies positive for norovirus in the hospital     IBD HISTORY -  She initially presented to [facility] in August 2021 w/ bloody diarrhea.  Colonoscopy 9/2021 showed normal TI, diffuse discontinuous abnormal vascularity, scarring, pseudopolyps. Minimal friability. Several shallow ulcers in right colon, rectum normal. Biopsies showed TI w lymphoid aggregates, colon biopsies showed mild chronic active to quiescent colitis in all biopsies  She was started on po prednisone and oral mesalamine but continued to have loose stools (2-3x per day). Started on Zeposia in September 2022 via patient assistance program (not covered by her insurance).  Colonoscopy feb 2023 - normal TI, mild erythema in whole colon ([place] 1), extensive pseudopolyposis in entire colon. path - quiescent colitis without evidence of active colitis or dysplasia.  Self discontinued Zeposia in Nov 2023 due to insurance issues  Continued po mesalamine

## 2024-05-15 NOTE — PHYSICAL EXAM
[Alert] : alert [Healthy Appearing] : healthy appearing [No Acute Distress] : no acute distress [Abdomen Tenderness] : non-tender [Abdomen Soft] : soft [Normal] : oriented to person, place, and time

## 2024-05-15 NOTE — ASSESSMENT
[FreeTextEntry1] : 48F with ulcerative colitis, former patient of Dr Pavon, here for f/u visit. Diagnosed in 2021, treated w prolonged prednisone taper, oral mesalamine, zeposia. Here for follow up visit. No fhx of IBD or GI malignancy.  # UC # anemia  - s/p colonoscopy 2/2023 - normal TI, mild erythema / Jon 1 in whole colon, extensive pseudo polyposis. Path showing focal minimal crypt distortion, per path - quiescent colitis without evidence of active colitis or dysplasia. - Repeat cscope in March 2024 w blood noted throughout the entire colon. Subsequent admission to [facility] w repeat inpatient cscope showing only hemorrhoids and pseudo polyps - Inpatient EGD negative for any source of GI bleed  - Outpatient capsule endoscopy completed in April 2024 negative for bleeding or inflammation  - s/p 2nd admission for bleeding as above  - agree w PO iron - will re-check cbc, iron studies today  - ok to continue meslamine - continue po  prednisone 40mg po qday. Will plan for taper once she starts induction dosing of entyvio  - start entyvio for management of UC. R/B/A discussed in detail   # Norovirus - unclear significance or if active infection  - currently without diarrhea and having solid nonbloody bowel movements - will continue to monitor   # Preventative Care - saw GYN 2023, reports normal evaluation. - follows w derm, advised to make appt for annual skin check - dexa scan 2023 with PCP showing osteopenia per patient. - follows w pcp for vaccines, states she is up to date with all vaccines - cscope as above, will need q yearly repeat exams due to extensive pseudopolyposis  - will continue to follow w both Dr Pavon and this office  RTC after entyvio infusion

## 2024-05-30 ENCOUNTER — NON-APPOINTMENT (OUTPATIENT)
Age: 49
End: 2024-05-30

## 2024-06-04 RX ORDER — MESALAMINE 0.38 G/1
0.38 CAPSULE, EXTENDED RELEASE ORAL DAILY
Qty: 120 | Refills: 0 | Status: ACTIVE | COMMUNITY
Start: 2023-12-18 | End: 1900-01-01

## 2024-06-12 ENCOUNTER — RX RENEWAL (OUTPATIENT)
Age: 49
End: 2024-06-12

## 2024-06-12 RX ORDER — FERROUS SULFATE TAB EC 324 MG (65 MG FE EQUIVALENT) 324 (65 FE) MG
324 (65 FE) TABLET DELAYED RESPONSE ORAL DAILY
Qty: 30 | Refills: 0 | Status: ACTIVE | COMMUNITY
Start: 2024-05-08 | End: 1900-01-01

## 2024-06-17 ENCOUNTER — APPOINTMENT (OUTPATIENT)
Dept: RHEUMATOLOGY | Facility: CLINIC | Age: 49
End: 2024-06-17
Payer: COMMERCIAL

## 2024-06-17 ENCOUNTER — NON-APPOINTMENT (OUTPATIENT)
Age: 49
End: 2024-06-17

## 2024-06-17 VITALS
TEMPERATURE: 98.1 F | OXYGEN SATURATION: 99 % | BODY MASS INDEX: 23.63 KG/M2 | DIASTOLIC BLOOD PRESSURE: 80 MMHG | RESPIRATION RATE: 16 BRPM | HEART RATE: 95 BPM | SYSTOLIC BLOOD PRESSURE: 131 MMHG | WEIGHT: 160 LBS

## 2024-06-17 VITALS — OXYGEN SATURATION: 98 % | DIASTOLIC BLOOD PRESSURE: 75 MMHG | HEART RATE: 93 BPM | SYSTOLIC BLOOD PRESSURE: 117 MMHG

## 2024-06-17 PROCEDURE — 96413 CHEMO IV INFUSION 1 HR: CPT

## 2024-06-17 RX ORDER — VEDOLIZUMAB 300 MG/5ML
300 INJECTION, POWDER, LYOPHILIZED, FOR SOLUTION INTRAVENOUS
Qty: 0 | Refills: 0 | Status: COMPLETED
Start: 2024-05-14

## 2024-06-17 NOTE — HISTORY OF PRESENT ILLNESS
[Denies] : Denies [No] : No [N/A] : N/A [Yes] : Yes [24g] : 24g [Start Time: ___] : Medication Start Time: [unfilled] [End Time: ___] : Medication End Time: [unfilled] [Medication Name: ___] : Medication Name: [unfilled] [Total Amount Administered: ___] : Total Amount Administered: [unfilled] [IV discontinued. Intact. No signs or symptoms of IV complications noted. Time: ___] : IV discontinued. Intact. No signs or symptoms of IV complications noted. Time: [unfilled] [Patient  instructed to seek medical attention with signs and symptoms of adverse effects] : Patient  instructed to seek medical attention with signs and symptoms of adverse effects [Patient left unit in no acute distress] : Patient left unit in no acute distress [Medications administered as ordered and tolerated well.] : Medications administered as ordered and tolerated well. [de-identified] : left medial  [de-identified] : Patient presents for week 0 of Entyvio infusion, doing well overall.  Patient has hx of ulcerative colitis, denies of being on any other biologics in the past, currently treating dx with prednisone 10mg. Patient reports having 2-3BMs daily, stool with blood but no mucus. Patient reports having abdominal pain, cramping, bloating and urgency. Patient denies any pain today. No other symptoms or concerns reported as this time. Patient denies any recent infection or antibiotic use. Patient oriented to infusion facility. Education and teaching materials provided to patient. Patient verbalized understanding. Patient consented to today's infusion. Patient tolerated infusion well. No immediate AEs noted. Patient left unit ambulatory in [facility].

## 2024-06-24 ENCOUNTER — RX RENEWAL (OUTPATIENT)
Age: 49
End: 2024-06-24

## 2024-06-24 RX ORDER — PREDNISONE 5 MG/1
5 TABLET ORAL
Qty: 30 | Refills: 0 | Status: ACTIVE | COMMUNITY
Start: 2024-05-30 | End: 1900-01-01

## 2024-07-08 ENCOUNTER — APPOINTMENT (OUTPATIENT)
Dept: RHEUMATOLOGY | Facility: CLINIC | Age: 49
End: 2024-07-08
Payer: COMMERCIAL

## 2024-07-08 ENCOUNTER — APPOINTMENT (OUTPATIENT)
Dept: GASTROENTEROLOGY | Facility: CLINIC | Age: 49
End: 2024-07-08
Payer: COMMERCIAL

## 2024-07-08 VITALS
SYSTOLIC BLOOD PRESSURE: 117 MMHG | DIASTOLIC BLOOD PRESSURE: 76 MMHG | WEIGHT: 160 LBS | HEIGHT: 69 IN | HEART RATE: 107 BPM | BODY MASS INDEX: 23.7 KG/M2 | RESPIRATION RATE: 14 BRPM | OXYGEN SATURATION: 98 %

## 2024-07-08 VITALS
HEART RATE: 99 BPM | OXYGEN SATURATION: 98 % | DIASTOLIC BLOOD PRESSURE: 72 MMHG | RESPIRATION RATE: 16 BRPM | TEMPERATURE: 98.4 F | SYSTOLIC BLOOD PRESSURE: 118 MMHG

## 2024-07-08 VITALS — OXYGEN SATURATION: 98 % | HEART RATE: 95 BPM | DIASTOLIC BLOOD PRESSURE: 66 MMHG | SYSTOLIC BLOOD PRESSURE: 113 MMHG

## 2024-07-08 DIAGNOSIS — K51.90 ULCERATIVE COLITIS, UNSPECIFIED, W/OUT COMPLICATIONS: ICD-10-CM

## 2024-07-08 PROCEDURE — 96413 CHEMO IV INFUSION 1 HR: CPT

## 2024-07-08 PROCEDURE — 99213 OFFICE O/P EST LOW 20 MIN: CPT

## 2024-07-08 RX ORDER — PREDNISONE 10 MG/1
10 TABLET ORAL DAILY
Qty: 30 | Refills: 0 | Status: ACTIVE | COMMUNITY
Start: 2024-07-08 | End: 1900-01-01

## 2024-07-08 RX ORDER — VEDOLIZUMAB 300 MG/5ML
300 INJECTION, POWDER, LYOPHILIZED, FOR SOLUTION INTRAVENOUS
Qty: 0 | Refills: 0 | Status: COMPLETED
Start: 2024-05-14

## 2024-07-15 ENCOUNTER — RX RENEWAL (OUTPATIENT)
Age: 49
End: 2024-07-15

## 2024-07-29 ENCOUNTER — RX RENEWAL (OUTPATIENT)
Age: 49
End: 2024-07-29

## 2024-08-05 ENCOUNTER — RX RENEWAL (OUTPATIENT)
Age: 49
End: 2024-08-05

## 2024-08-05 ENCOUNTER — APPOINTMENT (OUTPATIENT)
Dept: RHEUMATOLOGY | Facility: CLINIC | Age: 49
End: 2024-08-05

## 2024-08-05 PROCEDURE — 96413 CHEMO IV INFUSION 1 HR: CPT

## 2024-08-05 NOTE — HISTORY OF PRESENT ILLNESS
[Denies] : Denies [No] : No [Yes] : Yes [Declined] : Declined [24g] : 24g [Medication Name: ___] : Medication Name: [unfilled] [Total Amount Administered: ___] : Total Amount Administered: [unfilled] [IV discontinued. Intact. No signs or symptoms of IV complications noted. Time: ___] : IV discontinued. Intact. No signs or symptoms of IV complications noted. Time: [unfilled] [Patient  instructed to seek medical attention with signs and symptoms of adverse effects] : Patient  instructed to seek medical attention with signs and symptoms of adverse effects [Patient left unit in no acute distress] : Patient left unit in no acute distress [Medications administered as ordered and tolerated well.] : Medications administered as ordered and tolerated well. [Start Time: ___] : Medication Start Time: [unfilled] [End Time: ___] : Medication End Time: [unfilled] [de-identified] : left basilic vein [de-identified] : Patient presents for week6 Entyvio infusion, doing well overall.  Patient denies any recent infections, ABX use or hospitalizations.  Patient continues prednisone 10mg. Patient reports having 2-3BMs daily, formed stool with blood in the morning stools most days, but no mucous present. Patient denies abdominal cramping, pain and urgency.  No other symptoms or concerns reported as this time. Patient tolerated infusion well and left unit ambulatory in [facility].

## 2024-08-12 ENCOUNTER — RX RENEWAL (OUTPATIENT)
Age: 49
End: 2024-08-12

## 2024-08-12 ENCOUNTER — APPOINTMENT (OUTPATIENT)
Dept: GASTROENTEROLOGY | Facility: CLINIC | Age: 49
End: 2024-08-12

## 2024-08-12 ENCOUNTER — APPOINTMENT (OUTPATIENT)
Dept: GASTROENTEROLOGY | Facility: CLINIC | Age: 49
End: 2024-08-12
Payer: COMMERCIAL

## 2024-08-12 DIAGNOSIS — D64.9 ANEMIA, UNSPECIFIED: ICD-10-CM

## 2024-08-12 DIAGNOSIS — K51.90 ULCERATIVE COLITIS, UNSPECIFIED, W/OUT COMPLICATIONS: ICD-10-CM

## 2024-08-12 PROCEDURE — 99213 OFFICE O/P EST LOW 20 MIN: CPT

## 2024-08-12 NOTE — PLAN
[FreeTextEntry1] : 48F with ulcerative colitis, former patient of Dr Pavon, here for f/u visit. Diagnosed in 2021, previously treated w prolonged prednisone taper, oral mesalamine, zeposia. Here for follow up visit. No fhx of IBD or GI malignancy.  # UC # anemia - s/p colonoscopy 2/2023 - normal TI, mild erythema / Jon 1 in whole colon, extensive pseudo polyposis. Path showing focal minimal crypt distortion, per path - quiescent colitis without evidence of active colitis or dysplasia. - Repeat cscope in March 2024 w blood noted throughout the entire colon. Subsequent admission to [facility] w repeat inpatient cscope showing only hemorrhoids and pseudo polyps - Inpatient EGD negative for any source of GI bleed - Outpatient capsule endoscopy completed in April 2024 negative for bleeding or inflammation - continue w PO iron - ok to continue meslamine - started entyvio in july 2024, s/p induction dose x 3. First maintenance dose in October  - currently on prednisone 10mg po qday. Will taper down to 5mg and then stop.   # Preventative Care - saw GYN 2023, reports normal evaluation. - follows w derm, advised to make appt for annual skin check - dexa scan 2023 with PCP showing osteopenia per patient. - follows w pcp for vaccines, states she is up to date with all vaccines - cscope as above, will need q yearly repeat exams due to extensive pseudopolyposis   RTC in 1 month.

## 2024-08-12 NOTE — PLAN
[FreeTextEntry1] : 48F with ulcerative colitis, former patient of Dr Pavon, here for f/u visit. Diagnosed in 2021, previously treated w prolonged prednisone taper, oral mesalamine, zeposia. Here for follow up visit. No fhx of IBD or GI malignancy.  # UC # anemia - s/p colonoscopy 2/2023 - normal TI, mild erythema / Jon 1 in whole colon, extensive pseudo polyposis. Path showing focal minimal crypt distortion, per path - quiescent colitis without evidence of active colitis or dysplasia. - Repeat cscope in March 2024 w blood noted throughout the entire colon. Subsequent admission to [facility] w repeat inpatient cscope showing only hemorrhoids and pseudo polyps - Inpatient EGD negative for any source of GI bleed - Outpatient capsule endoscopy completed in April 2024 negative for bleeding or inflammation - continue w PO iron - ok to continue meslamine - started entyvio in july 2024, s/p induction dose x 3. First maintenance dose in October  - currently on prednisone 10mg po qday. Will taper down to 5mg and then stop.   # Preventative Care - saw GYN 2023, reports normal evaluation. - follows w derm, advised to make appt for annual skin check - dexa scan 2023 with PCP showing osteopenia per patient. - follows w pcp for vaccines, states she is up to date with all vaccines - cscope as above, will need q yearly repeat exams due to extensive pseudopolyposis   RTC in 1 month.

## 2024-08-12 NOTE — HISTORY OF PRESENT ILLNESS
[Home] : at home, [unfilled] , at the time of the visit. [Medical Office: ([facility])___] : at the medical office located in  [Verbal consent obtained from patient] : the patient, [unfilled] [FreeTextEntry1] : Two way audio visual equipment used   Feels well overall  She is s/p 3rd induction dose of entyvio last week.  She has been tolerating it well  Currently on prednisone 10mg po qday  Had two days of diarrhea w some blood a few days prior the most recent infusion She increased prednisone at that time to 15mg po qday and symptoms subsequently resolved   Currently has 1-2 bowel movements per day Stool is formed, nonbloody  Denies abd pain, n/v, fevers, chills, rashes, joint pain, wt loss      [Time Spent: ___ minutes] : I have spent [unfilled] minutes with the patient on the telephone

## 2024-08-12 NOTE — HISTORY OF PRESENT ILLNESS
[Home] : at home, [unfilled] , at the time of the visit. [Medical Office: ([facility])___] : at the medical office located in  [Verbal consent obtained from patient] : the patient, [unfilled] [FreeTextEntry1] : Two way audio visual equipment used   Feels well overall  She is s/p 3rd induction dose of entyvio last week.  She has been tolerating it well  Currently on prednisone 10mg po qday  Had two days of diarrhea w some blood a few days prior the most recent infusion She increased prednisone at that time to 15mg po qday and symptoms subsequently resolved   Currently has 1-2 bowel movements per day Stool is formed, nonbloody  Denies abd pain, n/v, fevers, chills, rashes, joint pain, wt loss      [Time Spent: ___ minutes] : I have spent [unfilled] minutes with the patient on the telephone

## 2024-08-13 ENCOUNTER — APPOINTMENT (OUTPATIENT)
Dept: GASTROENTEROLOGY | Facility: CLINIC | Age: 49
End: 2024-08-13

## 2024-08-26 ENCOUNTER — RX RENEWAL (OUTPATIENT)
Age: 49
End: 2024-08-26

## 2024-09-03 ENCOUNTER — RX RENEWAL (OUTPATIENT)
Age: 49
End: 2024-09-03

## 2024-09-04 ENCOUNTER — RX RENEWAL (OUTPATIENT)
Age: 49
End: 2024-09-04

## 2024-09-23 ENCOUNTER — RX RENEWAL (OUTPATIENT)
Age: 49
End: 2024-09-23

## 2024-10-01 ENCOUNTER — APPOINTMENT (OUTPATIENT)
Dept: RHEUMATOLOGY | Facility: CLINIC | Age: 49
End: 2024-10-01
Payer: COMMERCIAL

## 2024-10-01 VITALS
DIASTOLIC BLOOD PRESSURE: 98 MMHG | SYSTOLIC BLOOD PRESSURE: 165 MMHG | TEMPERATURE: 98 F | HEART RATE: 90 BPM | RESPIRATION RATE: 16 BRPM | OXYGEN SATURATION: 98 %

## 2024-10-01 VITALS
SYSTOLIC BLOOD PRESSURE: 165 MMHG | DIASTOLIC BLOOD PRESSURE: 95 MMHG | HEART RATE: 89 BPM | OXYGEN SATURATION: 97 % | RESPIRATION RATE: 16 BRPM

## 2024-10-01 PROCEDURE — 96413 CHEMO IV INFUSION 1 HR: CPT

## 2024-10-01 RX ORDER — VEDOLIZUMAB 300 MG/5ML
300 INJECTION, POWDER, LYOPHILIZED, FOR SOLUTION INTRAVENOUS
Qty: 0 | Refills: 0 | Status: COMPLETED
Start: 2024-05-14

## 2024-10-01 NOTE — HISTORY OF PRESENT ILLNESS
[Denies] : Denies [No] : No [Yes] : Yes [Declined] : Declined [24g] : 24g [Start Time: ___] : Medication Start Time: [unfilled] [End Time: ___] : Medication End Time: [unfilled] [Medication Name: ___] : Medication Name: [unfilled] [Total Amount Administered: ___] : Total Amount Administered: [unfilled] [IV discontinued. Intact. No signs or symptoms of IV complications noted. Time: ___] : IV discontinued. Intact. No signs or symptoms of IV complications noted. Time: [unfilled] [Patient  instructed to seek medical attention with signs and symptoms of adverse effects] : Patient  instructed to seek medical attention with signs and symptoms of adverse effects [Patient left unit in no acute distress] : Patient left unit in no acute distress [Medications administered as ordered and tolerated well.] : Medications administered as ordered and tolerated well. [de-identified] : left basilic vein [de-identified] : Patient presents for Entyvio infusion, doing well overall.  Patient denies any recent infections, ABX use or hospitalizations. Patient reports having 2-3BMs daily, formed stools without blood and mucous present. Patient denies abdominal cramping, pain and urgency.  No other symptoms or concerns reported as this time. Patient tolerated infusion well and left unit ambulatory in [facility].

## 2024-10-07 ENCOUNTER — RX RENEWAL (OUTPATIENT)
Age: 49
End: 2024-10-07

## 2024-10-07 LAB
M TB IFN-G BLD-IMP: NEGATIVE
QUANTIFERON TB PLUS MITOGEN MINUS NIL: >10 IU/ML
QUANTIFERON TB PLUS NIL: 0.06 IU/ML
QUANTIFERON TB PLUS TB1 MINUS NIL: 0 IU/ML
QUANTIFERON TB PLUS TB2 MINUS NIL: 0 IU/ML

## 2024-11-04 ENCOUNTER — RX RENEWAL (OUTPATIENT)
Age: 49
End: 2024-11-04

## 2024-11-19 ENCOUNTER — APPOINTMENT (OUTPATIENT)
Dept: GASTROENTEROLOGY | Facility: CLINIC | Age: 49
End: 2024-11-19
Payer: COMMERCIAL

## 2024-11-19 VITALS
HEIGHT: 69 IN | OXYGEN SATURATION: 97 % | HEART RATE: 88 BPM | SYSTOLIC BLOOD PRESSURE: 141 MMHG | WEIGHT: 168 LBS | DIASTOLIC BLOOD PRESSURE: 84 MMHG | BODY MASS INDEX: 24.88 KG/M2

## 2024-11-19 DIAGNOSIS — K51.90 ULCERATIVE COLITIS, UNSPECIFIED, W/OUT COMPLICATIONS: ICD-10-CM

## 2024-11-19 PROCEDURE — 99214 OFFICE O/P EST MOD 30 MIN: CPT

## 2024-12-03 ENCOUNTER — APPOINTMENT (OUTPATIENT)
Dept: RHEUMATOLOGY | Facility: CLINIC | Age: 49
End: 2024-12-03
Payer: COMMERCIAL

## 2024-12-03 VITALS
TEMPERATURE: 97.9 F | DIASTOLIC BLOOD PRESSURE: 86 MMHG | HEART RATE: 89 BPM | OXYGEN SATURATION: 98 % | SYSTOLIC BLOOD PRESSURE: 138 MMHG

## 2024-12-03 VITALS — HEART RATE: 88 BPM | OXYGEN SATURATION: 98 % | SYSTOLIC BLOOD PRESSURE: 129 MMHG | DIASTOLIC BLOOD PRESSURE: 74 MMHG

## 2024-12-03 PROCEDURE — 96413 CHEMO IV INFUSION 1 HR: CPT

## 2024-12-03 RX ORDER — VEDOLIZUMAB 300 MG/5ML
300 INJECTION, POWDER, LYOPHILIZED, FOR SOLUTION INTRAVENOUS
Qty: 0 | Refills: 0 | Status: COMPLETED
Start: 2024-05-14

## 2024-12-09 ENCOUNTER — RX RENEWAL (OUTPATIENT)
Age: 49
End: 2024-12-09

## 2024-12-16 ENCOUNTER — APPOINTMENT (OUTPATIENT)
Dept: PAIN MANAGEMENT | Facility: CLINIC | Age: 49
End: 2024-12-16
Payer: COMMERCIAL

## 2024-12-16 VITALS — HEIGHT: 69 IN | WEIGHT: 169 LBS | BODY MASS INDEX: 25.03 KG/M2

## 2024-12-16 DIAGNOSIS — M54.50 LOW BACK PAIN, UNSPECIFIED: ICD-10-CM

## 2024-12-16 DIAGNOSIS — M62.830 MUSCLE SPASM OF BACK: ICD-10-CM

## 2024-12-16 DIAGNOSIS — M79.10 MYALGIA, UNSPECIFIED SITE: ICD-10-CM

## 2024-12-16 PROCEDURE — 99214 OFFICE O/P EST MOD 30 MIN: CPT | Mod: 25

## 2024-12-16 PROCEDURE — J3490M: CUSTOM

## 2024-12-16 PROCEDURE — 20552 NJX 1/MLT TRIGGER POINT 1/2: CPT

## 2024-12-17 ENCOUNTER — RX RENEWAL (OUTPATIENT)
Age: 49
End: 2024-12-17

## 2025-01-06 ENCOUNTER — RX RENEWAL (OUTPATIENT)
Age: 50
End: 2025-01-06

## 2025-01-06 ENCOUNTER — APPOINTMENT (OUTPATIENT)
Dept: PAIN MANAGEMENT | Facility: CLINIC | Age: 50
End: 2025-01-06

## 2025-01-07 ENCOUNTER — APPOINTMENT (OUTPATIENT)
Dept: PAIN MANAGEMENT | Facility: CLINIC | Age: 50
End: 2025-01-07

## 2025-01-13 ENCOUNTER — RX RENEWAL (OUTPATIENT)
Age: 50
End: 2025-01-13

## 2025-01-22 ENCOUNTER — APPOINTMENT (OUTPATIENT)
Age: 50
End: 2025-01-22
Payer: COMMERCIAL

## 2025-01-22 PROCEDURE — 64494 INJ PARAVERT F JNT L/S 2 LEV: CPT | Mod: 50,59

## 2025-01-22 PROCEDURE — 64493 INJ PARAVERT F JNT L/S 1 LEV: CPT | Mod: 50,59

## 2025-01-28 ENCOUNTER — APPOINTMENT (OUTPATIENT)
Dept: RHEUMATOLOGY | Facility: CLINIC | Age: 50
End: 2025-01-28
Payer: COMMERCIAL

## 2025-01-28 ENCOUNTER — NON-APPOINTMENT (OUTPATIENT)
Age: 50
End: 2025-01-28

## 2025-01-28 VITALS
DIASTOLIC BLOOD PRESSURE: 80 MMHG | RESPIRATION RATE: 16 BRPM | OXYGEN SATURATION: 97 % | SYSTOLIC BLOOD PRESSURE: 124 MMHG | HEART RATE: 80 BPM

## 2025-01-28 VITALS
RESPIRATION RATE: 16 BRPM | OXYGEN SATURATION: 96 % | SYSTOLIC BLOOD PRESSURE: 136 MMHG | HEART RATE: 77 BPM | TEMPERATURE: 98.1 F | DIASTOLIC BLOOD PRESSURE: 86 MMHG

## 2025-01-28 PROCEDURE — 96413 CHEMO IV INFUSION 1 HR: CPT

## 2025-01-28 RX ORDER — VEDOLIZUMAB 300 MG/5ML
300 INJECTION, POWDER, LYOPHILIZED, FOR SOLUTION INTRAVENOUS
Qty: 0 | Refills: 0 | Status: COMPLETED
Start: 2024-05-14

## 2025-02-03 ENCOUNTER — RX RENEWAL (OUTPATIENT)
Age: 50
End: 2025-02-03

## 2025-02-07 ENCOUNTER — APPOINTMENT (OUTPATIENT)
Dept: PAIN MANAGEMENT | Facility: CLINIC | Age: 50
End: 2025-02-07
Payer: COMMERCIAL

## 2025-02-07 VITALS — BODY MASS INDEX: 25.33 KG/M2 | WEIGHT: 171 LBS | HEIGHT: 69 IN

## 2025-02-07 DIAGNOSIS — M54.50 LOW BACK PAIN, UNSPECIFIED: ICD-10-CM

## 2025-02-07 DIAGNOSIS — M79.10 MYALGIA, UNSPECIFIED SITE: ICD-10-CM

## 2025-02-07 PROCEDURE — 99214 OFFICE O/P EST MOD 30 MIN: CPT

## 2025-02-10 ENCOUNTER — RX RENEWAL (OUTPATIENT)
Age: 50
End: 2025-02-10

## 2025-02-13 ENCOUNTER — APPOINTMENT (OUTPATIENT)
Dept: GASTROENTEROLOGY | Facility: CLINIC | Age: 50
End: 2025-02-13
Payer: COMMERCIAL

## 2025-02-13 DIAGNOSIS — K51.90 ULCERATIVE COLITIS, UNSPECIFIED, W/OUT COMPLICATIONS: ICD-10-CM

## 2025-02-13 PROCEDURE — 99213 OFFICE O/P EST LOW 20 MIN: CPT | Mod: 95

## 2025-02-13 RX ORDER — FAMOTIDINE 40 MG/1
40 TABLET, FILM COATED ORAL
Qty: 30 | Refills: 0 | Status: ACTIVE | COMMUNITY
Start: 2025-02-13 | End: 1900-01-01

## 2025-03-12 ENCOUNTER — APPOINTMENT (OUTPATIENT)
Age: 50
End: 2025-03-12
Payer: COMMERCIAL

## 2025-03-12 PROCEDURE — 64494 INJ PARAVERT F JNT L/S 2 LEV: CPT | Mod: 50,59

## 2025-03-12 PROCEDURE — 64493 INJ PARAVERT F JNT L/S 1 LEV: CPT | Mod: 50

## 2025-03-24 ENCOUNTER — APPOINTMENT (OUTPATIENT)
Dept: PAIN MANAGEMENT | Facility: CLINIC | Age: 50
End: 2025-03-24
Payer: COMMERCIAL

## 2025-03-24 DIAGNOSIS — M54.17 RADICULOPATHY, LUMBOSACRAL REGION: ICD-10-CM

## 2025-03-24 DIAGNOSIS — M54.16 RADICULOPATHY, LUMBAR REGION: ICD-10-CM

## 2025-03-24 DIAGNOSIS — M54.50 LOW BACK PAIN, UNSPECIFIED: ICD-10-CM

## 2025-03-24 PROCEDURE — 99214 OFFICE O/P EST MOD 30 MIN: CPT

## 2025-03-25 ENCOUNTER — APPOINTMENT (OUTPATIENT)
Dept: RHEUMATOLOGY | Facility: CLINIC | Age: 50
End: 2025-03-25
Payer: COMMERCIAL

## 2025-03-25 VITALS
OXYGEN SATURATION: 97 % | HEART RATE: 80 BPM | SYSTOLIC BLOOD PRESSURE: 142 MMHG | RESPIRATION RATE: 16 BRPM | DIASTOLIC BLOOD PRESSURE: 84 MMHG

## 2025-03-25 VITALS
DIASTOLIC BLOOD PRESSURE: 84 MMHG | OXYGEN SATURATION: 99 % | SYSTOLIC BLOOD PRESSURE: 145 MMHG | RESPIRATION RATE: 16 BRPM | HEART RATE: 84 BPM

## 2025-03-25 PROCEDURE — 96413 CHEMO IV INFUSION 1 HR: CPT

## 2025-03-25 RX ORDER — VEDOLIZUMAB 300 MG/5ML
300 INJECTION, POWDER, LYOPHILIZED, FOR SOLUTION INTRAVENOUS
Qty: 0 | Refills: 0 | Status: COMPLETED
Start: 2024-05-14

## 2025-04-01 ENCOUNTER — RX RENEWAL (OUTPATIENT)
Age: 50
End: 2025-04-01

## 2025-04-30 ENCOUNTER — APPOINTMENT (OUTPATIENT)
Age: 50
End: 2025-04-30

## 2025-05-13 ENCOUNTER — APPOINTMENT (OUTPATIENT)
Dept: PAIN MANAGEMENT | Facility: CLINIC | Age: 50
End: 2025-05-13

## 2025-05-20 ENCOUNTER — APPOINTMENT (OUTPATIENT)
Dept: RHEUMATOLOGY | Facility: CLINIC | Age: 50
End: 2025-05-20
Payer: COMMERCIAL

## 2025-05-20 VITALS
SYSTOLIC BLOOD PRESSURE: 136 MMHG | DIASTOLIC BLOOD PRESSURE: 83 MMHG | TEMPERATURE: 97.7 F | HEART RATE: 92 BPM | RESPIRATION RATE: 16 BRPM | OXYGEN SATURATION: 98 %

## 2025-05-20 VITALS
HEART RATE: 84 BPM | OXYGEN SATURATION: 97 % | DIASTOLIC BLOOD PRESSURE: 82 MMHG | RESPIRATION RATE: 16 BRPM | SYSTOLIC BLOOD PRESSURE: 125 MMHG

## 2025-05-20 PROCEDURE — 96413 CHEMO IV INFUSION 1 HR: CPT

## 2025-05-20 RX ORDER — VEDOLIZUMAB 300 MG/5ML
300 INJECTION, POWDER, LYOPHILIZED, FOR SOLUTION INTRAVENOUS
Qty: 0 | Refills: 0 | Status: COMPLETED
Start: 2024-05-14

## 2025-05-21 ENCOUNTER — APPOINTMENT (OUTPATIENT)
Age: 50
End: 2025-05-21
Payer: COMMERCIAL

## 2025-05-21 PROCEDURE — 64636 DESTROY L/S FACET JNT ADDL: CPT | Mod: 50,59

## 2025-05-21 PROCEDURE — 64635 DESTROY LUMB/SAC FACET JNT: CPT | Mod: 50

## 2025-06-05 ENCOUNTER — APPOINTMENT (OUTPATIENT)
Dept: PAIN MANAGEMENT | Facility: CLINIC | Age: 50
End: 2025-06-05

## 2025-06-09 ENCOUNTER — RX RENEWAL (OUTPATIENT)
Age: 50
End: 2025-06-09

## 2025-06-13 ENCOUNTER — APPOINTMENT (OUTPATIENT)
Dept: PAIN MANAGEMENT | Facility: CLINIC | Age: 50
End: 2025-06-13
Payer: COMMERCIAL

## 2025-06-13 VITALS — WEIGHT: 160 LBS | BODY MASS INDEX: 23.7 KG/M2 | HEIGHT: 69 IN

## 2025-06-13 PROCEDURE — 99213 OFFICE O/P EST LOW 20 MIN: CPT

## 2025-06-18 ENCOUNTER — APPOINTMENT (OUTPATIENT)
Dept: GASTROENTEROLOGY | Facility: CLINIC | Age: 50
End: 2025-06-18
Payer: COMMERCIAL

## 2025-06-18 VITALS
RESPIRATION RATE: 16 BRPM | DIASTOLIC BLOOD PRESSURE: 90 MMHG | SYSTOLIC BLOOD PRESSURE: 143 MMHG | WEIGHT: 171 LBS | HEART RATE: 91 BPM | OXYGEN SATURATION: 99 % | HEIGHT: 69 IN | BODY MASS INDEX: 25.33 KG/M2

## 2025-06-18 PROCEDURE — 99214 OFFICE O/P EST MOD 30 MIN: CPT

## 2025-06-18 RX ORDER — VEDOLIZUMAB 300 MG/5ML
300 INJECTION, POWDER, LYOPHILIZED, FOR SOLUTION INTRAVENOUS
Refills: 0 | Status: ACTIVE | OUTPATIENT
Start: 2025-06-18 | End: 1900-01-01

## 2025-06-18 RX ORDER — POLYETHYLENE GLYCOL 3350 17 G/17G
17 POWDER, FOR SOLUTION ORAL
Qty: 1 | Refills: 0 | Status: ACTIVE | COMMUNITY
Start: 2025-06-18 | End: 1900-01-01

## 2025-06-18 RX ADMIN — VEDOLIZUMAB 1 MG: 300 INJECTION, POWDER, LYOPHILIZED, FOR SOLUTION INTRAVENOUS at 00:00

## 2025-06-25 ENCOUNTER — TRANSCRIPTION ENCOUNTER (OUTPATIENT)
Age: 50
End: 2025-06-25

## 2025-06-25 LAB — CALPROTECTIN FECAL: 613 UG/G

## 2025-07-08 ENCOUNTER — TRANSCRIPTION ENCOUNTER (OUTPATIENT)
Age: 50
End: 2025-07-08

## 2025-07-09 ENCOUNTER — RX RENEWAL (OUTPATIENT)
Age: 50
End: 2025-07-09

## 2025-07-15 ENCOUNTER — APPOINTMENT (OUTPATIENT)
Dept: RHEUMATOLOGY | Facility: CLINIC | Age: 50
End: 2025-07-15
Payer: COMMERCIAL

## 2025-07-15 VITALS
RESPIRATION RATE: 16 BRPM | OXYGEN SATURATION: 97 % | SYSTOLIC BLOOD PRESSURE: 129 MMHG | HEART RATE: 72 BPM | DIASTOLIC BLOOD PRESSURE: 77 MMHG

## 2025-07-15 VITALS
SYSTOLIC BLOOD PRESSURE: 126 MMHG | RESPIRATION RATE: 16 BRPM | TEMPERATURE: 98 F | DIASTOLIC BLOOD PRESSURE: 80 MMHG | OXYGEN SATURATION: 99 % | HEART RATE: 86 BPM

## 2025-07-15 PROCEDURE — 96413 CHEMO IV INFUSION 1 HR: CPT

## 2025-08-11 ENCOUNTER — RX RENEWAL (OUTPATIENT)
Age: 50
End: 2025-08-11

## 2025-08-21 ENCOUNTER — APPOINTMENT (OUTPATIENT)
Dept: GASTROENTEROLOGY | Facility: GI CENTER | Age: 50
End: 2025-08-21

## 2025-08-21 ENCOUNTER — APPOINTMENT (OUTPATIENT)
Dept: GASTROENTEROLOGY | Facility: CLINIC | Age: 50
End: 2025-08-21

## 2025-08-21 PROCEDURE — 45380 COLONOSCOPY AND BIOPSY: CPT

## 2025-09-09 ENCOUNTER — RX RENEWAL (OUTPATIENT)
Age: 50
End: 2025-09-09

## 2025-09-09 ENCOUNTER — APPOINTMENT (OUTPATIENT)
Dept: RHEUMATOLOGY | Facility: CLINIC | Age: 50
End: 2025-09-09
Payer: COMMERCIAL

## 2025-09-09 VITALS
OXYGEN SATURATION: 99 % | DIASTOLIC BLOOD PRESSURE: 80 MMHG | RESPIRATION RATE: 16 BRPM | SYSTOLIC BLOOD PRESSURE: 125 MMHG | HEART RATE: 73 BPM

## 2025-09-09 VITALS
DIASTOLIC BLOOD PRESSURE: 87 MMHG | SYSTOLIC BLOOD PRESSURE: 138 MMHG | OXYGEN SATURATION: 98 % | HEART RATE: 85 BPM | RESPIRATION RATE: 16 BRPM | TEMPERATURE: 97.7 F

## 2025-09-09 PROCEDURE — 96413 CHEMO IV INFUSION 1 HR: CPT

## 2025-09-10 ENCOUNTER — APPOINTMENT (OUTPATIENT)
Dept: GASTROENTEROLOGY | Facility: CLINIC | Age: 50
End: 2025-09-10
Payer: COMMERCIAL

## 2025-09-10 DIAGNOSIS — K51.90 ULCERATIVE COLITIS, UNSPECIFIED, W/OUT COMPLICATIONS: ICD-10-CM

## 2025-09-10 PROCEDURE — G2211 COMPLEX E/M VISIT ADD ON: CPT | Mod: NC,95

## 2025-09-10 PROCEDURE — 99214 OFFICE O/P EST MOD 30 MIN: CPT | Mod: 95

## 2025-09-10 RX ORDER — MESALAMINE 1.2 G/1
1.2 TABLET, DELAYED RELEASE ORAL DAILY
Qty: 120 | Refills: 0 | Status: ACTIVE | COMMUNITY
Start: 2025-09-10 | End: 1900-01-01

## 2025-09-10 RX ADMIN — RISANKIZUMAB-RZAA 1200 MG/10ML: 60 INJECTION INTRAVENOUS at 00:00

## 2025-09-11 ENCOUNTER — NON-APPOINTMENT (OUTPATIENT)
Age: 50
End: 2025-09-11

## 2025-09-17 ENCOUNTER — APPOINTMENT (OUTPATIENT)
Dept: COLORECTAL SURGERY | Facility: CLINIC | Age: 50
End: 2025-09-17

## 2025-09-17 ENCOUNTER — NON-APPOINTMENT (OUTPATIENT)
Age: 50
End: 2025-09-17

## 2025-09-17 VITALS
TEMPERATURE: 98.06 F | RESPIRATION RATE: 15 BRPM | HEART RATE: 72 BPM | OXYGEN SATURATION: 96 % | DIASTOLIC BLOOD PRESSURE: 84 MMHG | SYSTOLIC BLOOD PRESSURE: 133 MMHG

## 2025-09-17 VITALS
HEART RATE: 81 BPM | WEIGHT: 174 LBS | DIASTOLIC BLOOD PRESSURE: 85 MMHG | OXYGEN SATURATION: 97 % | BODY MASS INDEX: 25.7 KG/M2 | SYSTOLIC BLOOD PRESSURE: 143 MMHG | TEMPERATURE: 97.52 F | RESPIRATION RATE: 16 BRPM

## 2025-09-17 RX ORDER — CETIRIZINE HYDROCHLORIDE 10 MG/1
10 TABLET, FILM COATED ORAL
Qty: 0 | Refills: 0 | Status: COMPLETED | OUTPATIENT
Start: 2025-09-15

## 2025-09-17 RX ORDER — ICOSAPENT ETHYL 1000 MG/1
1 CAPSULE ORAL
Refills: 0 | Status: ACTIVE | COMMUNITY
Start: 2025-09-17

## 2025-09-17 RX ORDER — FENOFIBRATE 54 MG/1
54 TABLET ORAL DAILY
Refills: 0 | Status: ACTIVE | COMMUNITY
Start: 2025-09-17

## 2025-09-17 RX ORDER — ACETAMINOPHEN 325 MG/1
325 TABLET ORAL
Qty: 0 | Refills: 0 | Status: COMPLETED | OUTPATIENT
Start: 2025-09-15

## 2025-09-17 RX ORDER — RISANKIZUMAB-RZAA 60 MG/ML
600 INJECTION INTRAVENOUS
Qty: 0 | Refills: 0 | Status: COMPLETED | OUTPATIENT
Start: 2025-09-10

## 2025-09-18 RX ORDER — RISANKIZUMAB-RZAA 360 MG/2.4
360 KIT SUBCUTANEOUS
Qty: 1 | Refills: 3 | Status: ACTIVE | COMMUNITY
Start: 2025-09-17 | End: 1900-01-01

## 2025-09-19 ENCOUNTER — RESULT REVIEW (OUTPATIENT)
Age: 50
End: 2025-09-19

## 2025-09-19 ENCOUNTER — APPOINTMENT (OUTPATIENT)
Dept: CT IMAGING | Facility: CLINIC | Age: 50
End: 2025-09-19
Payer: COMMERCIAL

## 2025-09-19 PROCEDURE — 74177 CT ABD & PELVIS W/CONTRAST: CPT | Mod: 26

## 2025-09-21 LAB
M TB IFN-G BLD-IMP: NEGATIVE
QUANTIFERON TB PLUS MITOGEN MINUS NIL: >10 IU/ML
QUANTIFERON TB PLUS NIL: 0.04 IU/ML
QUANTIFERON TB PLUS TB1 MINUS NIL: 0 IU/ML
QUANTIFERON TB PLUS TB2 MINUS NIL: 0 IU/ML

## (undated) DEVICE — CATH IV SAFE BC 20G X 1.16" (PINK)

## (undated) DEVICE — BITE BLOCK ADULT 20 X 27MM (GREEN)

## (undated) DEVICE — FORCEP RADIAL JAW 4 JUMBO 2.8MM 3.2MM 240CM ORANGE DISP

## (undated) DEVICE — ELCTR GROUNDING PAD ADULT COVIDIEN

## (undated) DEVICE — BRUSH COLONOSCOPY CYTOLOGY

## (undated) DEVICE — TUBING SUCTION CONN 6FT STERILE

## (undated) DEVICE — IRRIGATOR BIO SHIELD

## (undated) DEVICE — SOL INJ NS 0.9% 500ML 2 PORT

## (undated) DEVICE — SYR ALLIANCE II INFLATION 60ML

## (undated) DEVICE — PACK IV START WITH CHG

## (undated) DEVICE — SYR LUER LOK 50CC

## (undated) DEVICE — CATH IV SAFE BC 22G X 1" (BLUE)

## (undated) DEVICE — SENSOR O2 FINGER ADULT

## (undated) DEVICE — SUCTION YANKAUER NO CONTROL VENT

## (undated) DEVICE — FOLEY HOLDER STATLOCK 2 WAY ADULT

## (undated) DEVICE — TUBING IV SET GRAVITY 3Y 100" MACRO

## (undated) DEVICE — CLAMP BX HOT RAD JAW 3

## (undated) DEVICE — BIOPSY FORCEP RADIAL JAW 4 STANDARD WITH NEEDLE

## (undated) DEVICE — TUBING SUCTION 20FT

## (undated) DEVICE — BALLOON US ENDO

## (undated) DEVICE — POLY TRAP ETRAP